# Patient Record
Sex: MALE | Race: WHITE | Employment: OTHER | URBAN - METROPOLITAN AREA
[De-identification: names, ages, dates, MRNs, and addresses within clinical notes are randomized per-mention and may not be internally consistent; named-entity substitution may affect disease eponyms.]

---

## 2017-01-31 ENCOUNTER — TRANSFERRED RECORDS (OUTPATIENT)
Dept: HEALTH INFORMATION MANAGEMENT | Facility: CLINIC | Age: 82
End: 2017-01-31

## 2017-04-18 ENCOUNTER — TELEPHONE (OUTPATIENT)
Dept: FAMILY MEDICINE | Facility: CLINIC | Age: 82
End: 2017-04-18

## 2017-04-18 NOTE — TELEPHONE ENCOUNTER
Reason for Call: Fax recent labs    Detailed comments: caller would like most recent labs faxed to them.    Phone Number Patient can be reached at: Other phone number:  [phone]    Best Time: 7:30 am to 3:30 pm    Can we leave a detailed message on this number? YES    Call taken on 4/18/2017 at 1:02 PM by Emerita Forte

## 2017-11-02 ENCOUNTER — OFFICE VISIT (OUTPATIENT)
Dept: OPHTHALMOLOGY | Facility: CLINIC | Age: 82
End: 2017-11-02
Payer: COMMERCIAL

## 2017-11-02 DIAGNOSIS — Z96.1 PSEUDOPHAKIA: ICD-10-CM

## 2017-11-02 DIAGNOSIS — H43.813 POSTERIOR VITREOUS DETACHMENT, BILATERAL: ICD-10-CM

## 2017-11-02 DIAGNOSIS — H35.372 EPIRETINAL MEMBRANE, LEFT: ICD-10-CM

## 2017-11-02 DIAGNOSIS — H52.4 PRESBYOPIA: ICD-10-CM

## 2017-11-02 DIAGNOSIS — Z01.01 ENCOUNTER FOR EXAMINATION OF EYES AND VISION WITH ABNORMAL FINDINGS: Primary | ICD-10-CM

## 2017-11-02 PROCEDURE — 92015 DETERMINE REFRACTIVE STATE: CPT | Performed by: OPHTHALMOLOGY

## 2017-11-02 PROCEDURE — 92014 COMPRE OPH EXAM EST PT 1/>: CPT | Performed by: OPHTHALMOLOGY

## 2017-11-02 ASSESSMENT — EXTERNAL EXAM - RIGHT EYE: OD_EXAM: 2+ BROW PTOSIS

## 2017-11-02 ASSESSMENT — EXTERNAL EXAM - LEFT EYE: OS_EXAM: 2+ BROW PTOSIS

## 2017-11-02 ASSESSMENT — VISUAL ACUITY
OD_CC: 20/30
METHOD: SNELLEN - LINEAR
OS_CC: 20/30+3
OS_CC: J2+
OD_CC: J1

## 2017-11-02 ASSESSMENT — REFRACTION_MANIFEST
OD_SPHERE: -2.00
OS_CYLINDER: +0.50
OS_SPHERE: -0.50
OS_AXIS: 180
OD_ADD: +3.00
OD_CYLINDER: +1.25
OD_AXIS: 130
OS_ADD: +3.00

## 2017-11-02 ASSESSMENT — TONOMETRY
IOP_METHOD: APPLANATION
OS_IOP_MMHG: 11
OD_IOP_MMHG: 12

## 2017-11-02 ASSESSMENT — REFRACTION_WEARINGRX
SPECS_TYPE: BIFOCAL
OS_CYLINDER: SPHERE
OD_SPHERE: -1.87
OD_CYLINDER: +1.50
OS_SPHERE: -0.25
OD_ADD: +3.00
OD_AXIS: 030
OS_ADD: +3.00

## 2017-11-02 ASSESSMENT — SLIT LAMP EXAM - LIDS
COMMENTS: 2+ DERMATOCHALASIS - UPPER LID
COMMENTS: 2+ DERMATOCHALASIS - UPPER LID

## 2017-11-02 ASSESSMENT — CUP TO DISC RATIO
OD_RATIO: 0.4
OS_RATIO: 0.4

## 2017-11-02 NOTE — PROGRESS NOTES
Current Eye Medications:  no     Subjective:  Comprehensive eye exam.   Pt reports he continues see good reading and distance even without his glasses, states sees fine with his glasses when chooses to wear them.     Objective:  See Ophthalmology Exam.       Assessment:  Stable eye exam.      ICD-10-CM    1. Encounter for examination of eyes and vision with abnormal findings Z01.01 REFRACTIVE STATUS   2. Presbyopia H52.4 REFRACTIVE STATUS   3. Pseudophakia, ou; Yag Caps, od Z96.1 EYE EXAM (SIMPLE-NONBILLABLE)   4. Epiretinal membrane, mild, left H35.372    5. Posterior vitreous detachment, bilateral H43.813         Plan:  Possible clouding of posterior capsule discussed. Left eye   Glasses Rx given - optional   Call in July 2018 for an appointment in November 2018 for Complete Exam    Dr. Breaux [phone]

## 2017-11-02 NOTE — MR AVS SNAPSHOT
"              After Visit Summary   11/2/2017    Bishop Hinds    MRN: [id number]           Patient Information     Date Of Birth          5/3/1924        Visit Information        Provider Department      11/2/2017 9:00 AM Jeffrey Breaux MD [facility]        Today's Diagnoses     Encounter for examination of eyes and vision with abnormal findings    -  1    Presbyopia        Pseudophakia, ou; Yag Caps, od        Epiretinal membrane, mild, left        Posterior vitreous detachment, bilateral          Care Instructions    Possible clouding of posterior capsule discussed. Left eye   Glasses Rx given - optional   Call in July 2018 for an appointment in November 2018 for Complete Exam    Dr. Breaux [phone]          Follow-ups after your visit        Who to contact     If you have questions or need follow up information about today's clinic visit or your schedule please contact [facility] directly at [phone].  Normal or non-critical lab and imaging results will be communicated to you by MyChart, letter or phone within 4 business days after the clinic has received the results. If you do not hear from us within 7 days, please contact the clinic through [employer] or phone. If you have a critical or abnormal lab result, we will notify you by phone as soon as possible.  Submit refill requests through [employer] or call your pharmacy and they will forward the refill request to us. Please allow 3 business days for your refill to be completed.          Additional Information About Your Visit        MyChart Information     [facility] [facility] lets you send messages to your doctor, view your test results, renew your prescriptions, schedule appointments and more. To sign up, go to [email or web address] . Click on \"Log in\" on the left side of the screen, which will take you to the Welcome page. Then click on \"Sign up Now\" on the right side of the page.     You will be asked to enter the access code " listed below, as well as some personal information. Please follow the directions to create your username and password.     Your access code is: [email or web address]  Expires: 2018  9:54 AM     Your access code will  in 90 days. If you need help or a new code, please call your [place] clinic or [phone].        Care EveryWhere ID     This is your Care EveryWhere ID. This could be used by other organizations to access your [place] medical records  [id number]         Blood Pressure from Last 3 Encounters:   10/31/16 104/62   16 141/69   16 142/74    Weight from Last 3 Encounters:   10/31/16 80.3 kg (177 lb)   16 82.1 kg (181 lb)   16 82.3 kg (181 lb 6.4 oz)              We Performed the Following     EYE EXAM (SIMPLE-NONBILLABLE)     REFRACTIVE STATUS        Primary Care Provider Office Phone # Fax #    Juwan Dickson [phone] [address] [facility] [address]        Equal Access to Services     [facility]: Hadii aad ku hadasho Soomaali, waaxda luqadaha, qaybta kaalmada adeegyada, deandra guardado . So [facility] [phone].    ATENCIÓN: Si habla español, tiene a martell disposición servicios gratuitos de asistencia lingüística. Ll[facility] [phone].    We comply with applicable federal civil rights laws and [place] laws. We do not discriminate on the basis of race, color, national origin, age, disability, sex, sexual orientation, or gender identity.            Thank you!     Thank you for choosing [facility]  for your care. Our goal is always to provide you with excellent care. Hearing back from our patients is one way we can continue to improve our services. Please take a few minutes to complete the written survey that you may receive in the mail after your visit with us. Thank you!             Your Updated Medication List - Protect others around you: Learn how to safely use, store and throw away your medicines at  www.disposemymeds.org.          This list is accurate as of: 11/2/17  9:55 AM.  Always use your most recent med list.                   Brand Name Dispense Instructions for use Diagnosis    acetaminophen 325 MG tablet    TYLENOL    100 tablet    Take 2 tablets (650 mg) by mouth every 4 hours as needed    OA (osteoarthritis) of knee       allopurinol 300 MG tablet    ZYLOPRIM    90 tablet    Take 1 tablet (300 mg) by mouth daily    Gout       aspirin 81 MG tablet     100 tablet    Take 1 tablet (81 mg) by mouth daily    HTN (hypertension)       atenolol 25 MG tablet    TENORMIN    180 tablet    Take 1 tablet (25 mg) by mouth 2 times daily    HTN (hypertension)       finasteride 5 MG tablet    PROSCAR    90 tablet    Take 1 tablet (5 mg) by mouth daily    Hypertrophy of prostate with urinary obstruction and other lower urinary tract symptoms (LUTS)       HYDROCHLOROTHIAZIDE PO      Take 25 mg by mouth daily        LOSARTAN POTASSIUM PO      Take 50 mg by mouth daily        simvastatin 80 MG tablet    ZOCOR    90 tablet    1/2 TABLET EVERY EVENING    Hyperlipidemia LDL goal <130       tamsulosin 0.4 MG capsule    FLOMAX    90 capsule    Take 1 capsule (0.4 mg) by mouth At Bedtime    BPH (benign prostatic hyperplasia)       vitamin D 1000 UNITS capsule     90 capsule    Take 1 capsule by mouth daily    Hypovitaminosis D

## 2017-11-02 NOTE — PATIENT INSTRUCTIONS
Possible clouding of posterior capsule discussed. Left eye   Glasses Rx given - optional   Call in July 2018 for an appointment in November 2018 for Complete Exam    Dr. Breaux [phone]

## 2018-03-20 ENCOUNTER — TRANSFERRED RECORDS (OUTPATIENT)
Dept: HEALTH INFORMATION MANAGEMENT | Facility: CLINIC | Age: 83
End: 2018-03-20

## 2018-11-28 ENCOUNTER — TELEPHONE (OUTPATIENT)
Dept: FAMILY MEDICINE | Facility: CLINIC | Age: 83
End: 2018-11-28

## 2018-11-28 NOTE — TELEPHONE ENCOUNTER
Reason for Call:  Other call back    Detailed comments: Patient would like to know that his insurance coverage is good. Please call so he can relax (per patient).    Phone Number Patient can be reached at: Home number on file [phone] (home)    Best Time: Anytime    Can we leave a detailed message on this number? NO    Call taken on 11/28/2018 at 1:17 PM by Radha Crum

## 2019-03-13 ENCOUNTER — DOCUMENTATION ONLY (OUTPATIENT)
Dept: OPHTHALMOLOGY | Facility: CLINIC | Age: 84
End: 2019-03-13

## 2020-11-19 ENCOUNTER — TELEPHONE (OUTPATIENT)
Dept: FAMILY MEDICINE | Facility: CLINIC | Age: 85
End: 2020-11-19

## 2020-11-19 NOTE — TELEPHONE ENCOUNTER
"Per patient, he typically wakes up very early but oddly today, he woke up multiple times and fell asleep and did not wake up for the day until 2:00 PM   Each time he woke up, he was not able to focus his vision, \"eyes not focusing together\"  Changes in vision is new and sudden today and has not gotten any better  Patient does feel unsteady on his feet but stated it could be due to his vision   Denies any numbness/tingling, facial dropping, or weakness   He was not able to describe his vision other than, \"I am seeing 2 different things\" and \"eyes not focusing together\" and \"eyes seeing different things\"  Speech does not sound slurred  Breathing does not sound labored  Wife also elderly and has trouble explaining further  She does not think his face appears to be drooping  They are living alone     Advised that patient be seen in ED for further evaluation   They are not sure how to get to the hospital and unsure how to call for an ambulance  Advised that RN will call for them and explained that they will need to open the door when paramedics arrive  Explained that paramedics will first assess patient and determine if transfer to ED is indicated  Asked COVID19 screening questions and both are negative for COVID19 symptoms and exposure    Called 911 for ambulance to be dispatched to patient's home  They will send help out    Jose Jolly RN  "

## 2020-11-19 NOTE — TELEPHONE ENCOUNTER
Reason for call:  Symptom   Symptom or request: Eyes don't want to focus     Duration (how long have symptoms been present): This morning  Have you been treated for this before? No    Additional comments: na    Phone number to reach patient:  Other phone number:  [phone]    Best Time:  any    Can we leave a detailed message on this number?  YES    Travel screening: Not Applicable

## 2020-11-20 ENCOUNTER — TELEPHONE (OUTPATIENT)
Dept: FAMILY MEDICINE | Facility: CLINIC | Age: 85
End: 2020-11-20

## 2020-11-20 NOTE — TELEPHONE ENCOUNTER
Reason for call:  Order   Order or referral being requested: Neurology   Reason for request: per ER   Date needed: as soon as possible  Has the patient been seen by the PCP for this problem? Not Applicable    Additional comments: Patient's son calling stating that patient was in the ER and they're recommending that he sees a neurologist. They recommended Dr. Queen but she is booking out til January and they would like to see someone else sooner, please call to advise.    Phone number to reach patient:  Other phone number:  [phone]    Best Time:  Any     Can we leave a detailed message on this number?  YES    Travel screening: Not Applicable

## 2020-11-20 NOTE — TELEPHONE ENCOUNTER
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 Return if new symptoms.    We have not seen patient in over 4 years  Patient has not seen Dr. Dickson since 1/19/2016  Last saw Dr. Treviño on 10/31/2016    Dr. Kandi Queen is through [facility]  They have multiple locations son can try-    [place]:   [phone]  [place]:   [phone]  [place]:   [phone]  [place]:   [phone]  [place]:   [phone]    Called sonYang  He stated that neurology is checking other locations to see if they can get patient in sooner  He is waiting for their return call  He also has a phone number the ED gave to schedule ophth appointment  Per Yang, [facility]  Advised that he update the [facility] provider as well and see if they can get patient in with a [facility] neurologist asap  Advised that patient schedule an appointment with us if he would like [facility] to continue his cares here  Son verbalized understanding    Jose Jolly RN

## 2020-11-25 ENCOUNTER — TELEPHONE (OUTPATIENT)
Dept: OPHTHALMOLOGY | Facility: CLINIC | Age: 85
End: 2020-11-25

## 2020-11-25 NOTE — TELEPHONE ENCOUNTER
Spoke to patient and wife.  Received referral from Dr. Moreira for him to see Dr. Trivedi for diplopia.  Patient would like to check with his previous eye doctors to see if they feel he needs to be seen by Dr. Trivedi because he does not drive and would prefer not to come to the [place].  Gave them my direct number for them to call to schedule.     Lenore Madison on 11/25/2020 at 1:04 PM

## 2021-01-01 ENCOUNTER — LAB REQUISITION (OUTPATIENT)
Dept: LAB | Facility: CLINIC | Age: 86
End: 2021-01-01

## 2021-01-01 ENCOUNTER — TELEPHONE (OUTPATIENT)
Dept: FAMILY MEDICINE | Facility: CLINIC | Age: 86
End: 2021-01-01
Payer: COMMERCIAL

## 2021-01-01 ENCOUNTER — TRANSFERRED RECORDS (OUTPATIENT)
Dept: MULTI SPECIALTY CLINIC | Facility: CLINIC | Age: 86
End: 2021-01-01
Payer: COMMERCIAL

## 2021-01-01 ENCOUNTER — HEALTH MAINTENANCE LETTER (OUTPATIENT)
Age: 86
End: 2021-01-01

## 2021-01-01 ENCOUNTER — TELEPHONE (OUTPATIENT)
Dept: INTERNAL MEDICINE | Facility: CLINIC | Age: 86
End: 2021-01-01
Payer: COMMERCIAL

## 2021-01-01 DIAGNOSIS — M1A.0790 CHRONIC IDIOPATHIC GOUT INVOLVING TOE WITHOUT TOPHUS, UNSPECIFIED LATERALITY: ICD-10-CM

## 2021-01-01 DIAGNOSIS — Z01.812 ENCOUNTER FOR PREPROCEDURAL LABORATORY EXAMINATION: ICD-10-CM

## 2021-01-01 DIAGNOSIS — I10 ESSENTIAL (PRIMARY) HYPERTENSION: ICD-10-CM

## 2021-01-01 DIAGNOSIS — G47.00 INSOMNIA, UNSPECIFIED TYPE: ICD-10-CM

## 2021-01-01 DIAGNOSIS — N40.0 BPH (BENIGN PROSTATIC HYPERPLASIA): ICD-10-CM

## 2021-01-01 DIAGNOSIS — E55.9 VITAMIN D DEFICIENCY: ICD-10-CM

## 2021-01-01 DIAGNOSIS — I10 HYPERTENSION GOAL BP (BLOOD PRESSURE) < 140/90: ICD-10-CM

## 2021-01-01 DIAGNOSIS — N18.30 STAGE 3 CHRONIC KIDNEY DISEASE, UNSPECIFIED WHETHER STAGE 3A OR 3B CKD (H): Primary | ICD-10-CM

## 2021-01-01 LAB
ANION GAP SERPL CALCULATED.3IONS-SCNC: 13 MMOL/L (ref 5–18)
ANION GAP SERPL CALCULATED.3IONS-SCNC: 9 MMOL/L (ref 5–18)
BUN SERPL-MCNC: 40 MG/DL (ref 8–28)
BUN SERPL-MCNC: 40 MG/DL (ref 8–28)
CALCIUM SERPL-MCNC: 9.1 MG/DL (ref 8.5–10.5)
CALCIUM SERPL-MCNC: 9.6 MG/DL (ref 8.5–10.5)
CHLORIDE BLD-SCNC: 103 MMOL/L (ref 98–107)
CHLORIDE BLD-SCNC: 104 MMOL/L (ref 98–107)
CO2 SERPL-SCNC: 22 MMOL/L (ref 22–31)
CO2 SERPL-SCNC: 25 MMOL/L (ref 22–31)
CREAT SERPL-MCNC: 2.14 MG/DL (ref 0.7–1.3)
CREAT SERPL-MCNC: 2.33 MG/DL (ref 0.7–1.3)
ERYTHROCYTE [DISTWIDTH] IN BLOOD BY AUTOMATED COUNT: 13.8 % (ref 10–15)
GFR SERPL CREATININE-BSD FRML MDRD: 23 ML/MIN/1.73M2
GFR SERPL CREATININE-BSD FRML MDRD: 25 ML/MIN/1.73M2
GLUCOSE BLD-MCNC: 78 MG/DL (ref 70–125)
GLUCOSE BLD-MCNC: 98 MG/DL (ref 70–125)
HCT VFR BLD AUTO: 38 % (ref 40–53)
HGB BLD-MCNC: 12.3 G/DL (ref 13.3–17.7)
MCH RBC QN AUTO: 31.5 PG (ref 26.5–33)
MCHC RBC AUTO-ENTMCNC: 32.4 G/DL (ref 31.5–36.5)
MCV RBC AUTO: 97 FL (ref 78–100)
PLATELET # BLD AUTO: 321 10E3/UL (ref 150–450)
POTASSIUM BLD-SCNC: 4.5 MMOL/L (ref 3.5–5)
POTASSIUM BLD-SCNC: 4.8 MMOL/L (ref 3.5–5)
RBC # BLD AUTO: 3.9 10E6/UL (ref 4.4–5.9)
RETINOPATHY: NORMAL
SODIUM SERPL-SCNC: 138 MMOL/L (ref 136–145)
SODIUM SERPL-SCNC: 138 MMOL/L (ref 136–145)
WBC # BLD AUTO: 8.3 10E3/UL (ref 4–11)

## 2021-01-01 PROCEDURE — 80048 BASIC METABOLIC PNL TOTAL CA: CPT

## 2021-01-01 PROCEDURE — 80048 BASIC METABOLIC PNL TOTAL CA: CPT | Performed by: NURSE PRACTITIONER

## 2021-01-01 PROCEDURE — 85027 COMPLETE CBC AUTOMATED: CPT

## 2021-01-01 RX ORDER — FINASTERIDE 5 MG/1
TABLET, FILM COATED ORAL
Qty: 28 TABLET | Refills: 1 | Status: SHIPPED | OUTPATIENT
Start: 2021-01-01 | End: 2022-01-01

## 2021-01-01 RX ORDER — CHOLECALCIFEROL (VITAMIN D3) 25 MCG
TABLET ORAL
Qty: 28 TABLET | Refills: 1 | Status: SHIPPED | OUTPATIENT
Start: 2021-01-01 | End: 2022-01-01

## 2021-01-01 RX ORDER — MULTIVIT,CALC,MINS/IRON/FOLIC 9MG-400MCG
TABLET ORAL
Qty: 28 TABLET | Refills: 1 | Status: SHIPPED | OUTPATIENT
Start: 2021-01-01 | End: 2022-01-01

## 2021-01-01 RX ORDER — AMLODIPINE BESYLATE 5 MG/1
TABLET ORAL
Qty: 28 TABLET | Refills: 1 | Status: SHIPPED | OUTPATIENT
Start: 2021-01-01 | End: 2022-01-01

## 2021-01-01 RX ORDER — ALLOPURINOL 100 MG/1
TABLET ORAL
Qty: 28 TABLET | Refills: 1 | Status: SHIPPED | OUTPATIENT
Start: 2021-01-01 | End: 2022-01-01

## 2021-01-01 RX ORDER — QUETIAPINE FUMARATE 25 MG/1
TABLET, FILM COATED ORAL
Qty: 14 TABLET | Refills: 1 | Status: SHIPPED | OUTPATIENT
Start: 2021-01-01 | End: 2022-01-01

## 2021-01-01 RX ORDER — TAMSULOSIN HYDROCHLORIDE 0.4 MG/1
CAPSULE ORAL
Qty: 28 CAPSULE | Refills: 1 | Status: SHIPPED | OUTPATIENT
Start: 2021-01-01 | End: 2022-01-01

## 2021-01-19 ENCOUNTER — OFFICE VISIT (OUTPATIENT)
Dept: DERMATOLOGY | Facility: CLINIC | Age: 86
End: 2021-01-19
Payer: COMMERCIAL

## 2021-01-19 VITALS — SYSTOLIC BLOOD PRESSURE: 111 MMHG | DIASTOLIC BLOOD PRESSURE: 68 MMHG | HEART RATE: 65 BPM | OXYGEN SATURATION: 93 %

## 2021-01-19 DIAGNOSIS — L81.4 LENTIGO: ICD-10-CM

## 2021-01-19 DIAGNOSIS — D18.01 ANGIOMA OF SKIN: ICD-10-CM

## 2021-01-19 DIAGNOSIS — L82.1 SEBORRHEIC KERATOSIS: ICD-10-CM

## 2021-01-19 DIAGNOSIS — C44.712 BASAL CELL CARCINOMA (BCC) OF RIGHT LOWER LEG: Primary | ICD-10-CM

## 2021-01-19 PROCEDURE — 17313 MOHS 1 STAGE T/A/L: CPT | Performed by: DERMATOLOGY

## 2021-01-19 PROCEDURE — 99203 OFFICE O/P NEW LOW 30 MIN: CPT | Mod: 25 | Performed by: DERMATOLOGY

## 2021-01-19 PROCEDURE — 11102 TANGNTL BX SKIN SINGLE LES: CPT | Mod: 59 | Performed by: DERMATOLOGY

## 2021-01-19 PROCEDURE — 88331 PATH CONSLTJ SURG 1 BLK 1SPC: CPT | Mod: 59 | Performed by: DERMATOLOGY

## 2021-01-19 PROCEDURE — 12032 INTMD RPR S/A/T/EXT 2.6-7.5: CPT | Performed by: DERMATOLOGY

## 2021-01-19 PROCEDURE — 17314 MOHS ADDL STAGE T/A/L: CPT | Performed by: DERMATOLOGY

## 2021-01-19 NOTE — PATIENT INSTRUCTIONS
Open Wound Care           ? No strenuous activity for 48 hours    ? Take Tylenol as needed for discomfort.                                                .         ? Do not drink alcoholic beverages for 48 hours.    ? Keep the pressure bandage in place for 24 hours. If the bandage becomes blood tinged or loose, reinforce it with gauze and tape.        (Refer to the reverse side of this page for management of bleeding).    ? Remove bandage in 24 hours and begin wound care as follows:     1. Clean area with tap water using a Q tip or gauze pad, (shower / bathe normally)  2. Dry wound with Q tip or gauze pad  3. Apply Aquaphor, Vaseline, Polysporin or Bacitracin Ointment with a Q tip    Do NOT use Neosporin Ointment *  4. Cover the wound with a band-aid or nonstick gauze pad and paper tape.  5. Repeat wound care once a day until wound is completely healed.    It is an old wives tale that a wound heals better when it is exposed to air and allowed to dry out. The wound will heal faster with a better cosmetic result if it is kept moist with ointment and covered with a bandage.  Do not let the wound dry out.      Supplies Needed:                Qtips or gauze pads                Polysporin or Bacitracin Ointment                Bandaids or nonstick gauze pads and paper tape    Wound care kits and brown paper tape are available for purchase at   the pharmacy.    BLEEDIN. Use tightly rolled up gauze or cloth to apply direct pressure over the bandage for 20   minutes.  2. Reapply pressure for an additional 20 minutes if necessary  3. Call the office or go to the nearest emergency room if pressure fails to stop the bleeding.  4. Use additional gauze and tape to maintain pressure once the bleeding has stopped.  5. Begin wound care 24 hours after surgery as directed.                  WOUND HEALING    1. One week after surgery a pink / red halo will form around the outside of the wound.   This is new skin.  2. The center of  the wound will appear yellowish white and produce some drainage.  3. The pink halo will slowly migrate in toward the center of the wound until the wound is covered with new shiny pink skin.  4. There will be no more drainage when the wound is completely healed.  5. It will take six months to one year for the redness to fade.  6. The scar may be itchy, tight and sensitive to extreme temperatures for a year after the surgery.  7. Massaging the area several times a day for several minutes after the wound is completely healed will help the scar soften and normalize faster. Begin massage only after healing is complete.      In case of emergency call: Dr Herman: [phone]     [facility]: [phone]    [facility]: [phone]

## 2021-01-19 NOTE — LETTER
1/19/2021         RE: Bishop Hinds  [address]  [facility] [address]        Dear Colleague,    Thank you for referring your patient, Bishop Hinds, to the [facility]. Please see a copy of my visit note below.    Bishop Hinds is an extremely pleasant 89 year old year old male patient here today for non healing lesion on right lower leg.   .   Patient states this has been present for years.  Patient reports the following symptoms:  bleeding.  Patient reports the following previous treatments none.  These treatments did not work.  Patient reports the following modifying factors none.  Associated symptoms: none.  Patient has no other skin complaints today.  Remainder of the HPI, Meds, PMH, Allergies, FH, and SH was reviewed in chart.      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        Past Surgical History:   Procedure Laterality Date     ARTHROPLASTY MINIMALLY INVASIVE KNEE  2/5/2014    Procedure: ARTHROPLASTY MINIMALLY INVASIVE KNEE;  Right Total Knee Arthroplasty Minimally Invasive ;  Surgeon: Alvarez Giang MD;  Location: [facility]     CATARACT IOL, RT/LT       COLONOSCOPY  12/94    14 polypls     CYSTOSCOPY  2/2010     CYSTOSCOPY  2/2010    had  2 procedures for removal of bladder stones     EXCISE LESION CHEEK N/A 10/8/2014    Procedure: EXCISE LESION CHEEK;  Surgeon: Sarah Macdonald MD;  Location: [facility] OR     LASER YAG CAPSULOTOMY   3/2015    right eye     PHACOEMULSIFICATION WITH STANDARD INTRAOCULAR LENS IMPLANT  10/2014; 5/2015    right eye; left eye     TONSILLECTOMY & ADENOIDECTOMY  age of 6        Family History   Problem Relation Age of Onset     Cerebrovascular Disease Mother      Cancer Sister      Diabetes Son        Social History     Socioeconomic History     Marital status:      Spouse name: Not on file     Number of children: Not on file     Years of education: Not on file     Highest education level: Not on file   Occupational History     Not on file   Social Needs     Financial resource strain: Not on file     Food insecurity     Worry: Not on file     Inability: Not on file     Transportation needs     Medical: Not on file     Non-medical: Not on file   Tobacco Use     Smoking status: Former Smoker     Years: 60.00     Types: Cigarettes     Smokeless tobacco: Never Used   Substance and Sexual Activity     Alcohol use: Yes     Comment: Occasional     Drug use: No     Sexual activity: Not Currently   Lifestyle     Physical activity     Days per week: Not on file     Minutes per session: Not on file     Stress: Not on file   Relationships     Social connections     Talks on phone: Not on file     Gets together: Not on file     Attends Mosque service: Not on file     Active member of club or organization: Not on file     Attends meetings of clubs or organizations: Not on file     Relationship status: Not on file     Intimate partner violence     Fear of current or ex partner: Not on file     Emotionally abused: Not on file     Physically abused: Not on file     Forced sexual activity: Not on file   Other Topics Concern     Parent/sibling w/ CABG, MI or angioplasty before 65F 55M? No   Social History Narrative     Not on file       Outpatient Encounter Medications as of 1/19/2021   Medication Sig Dispense Refill     acetaminophen (TYLENOL) 325 MG tablet Take 2 tablets (650 mg) by mouth every 4 hours as needed 100 tablet       allopurinol (ZYLOPRIM) 300 MG tablet Take 1 tablet (300 mg) by mouth daily 90 tablet 1     aspirin 81 MG tablet Take 1 tablet (81 mg) by mouth daily 100 tablet 1     LOSARTAN POTASSIUM PO Take 50 mg by mouth daily       simvastatin (ZOCOR) 80 MG tablet 1/2 TABLET EVERY EVENING 90 tablet 1     atenolol (TENORMIN) 25 MG tablet Take 1 tablet (25 mg) by mouth 2 times daily (Patient not taking: Reported on 1/19/2021) 180 tablet 1     Cholecalciferol (VITAMIN D) 1000 UNITS capsule Take 1 capsule by mouth daily 90 capsule 1     finasteride (PROSCAR) 5 MG tablet Take 1 tablet (5 mg) by mouth daily (Patient not taking: Reported on 1/19/2021) 90 tablet 1     HYDROCHLOROTHIAZIDE PO Take 25 mg by mouth daily       tamsulosin (FLOMAX) 0.4 MG 24 hr capsule Take 1 capsule (0.4 mg) by mouth At Bedtime (Patient not taking: Reported on 1/19/2021) 90 capsule 1     No facility-administered encounter medications on file as of 1/19/2021.              Review Of Systems  Skin: As above  Eyes: negative  Ears/Nose/Throat: negative  Respiratory: No shortness of breath, dyspnea on exertion, cough, or hemoptysis  Cardiovascular: negative  Gastrointestinal: negative  Genitourinary: negative  Musculoskeletal: negative  Neurologic: negative  Psychiatric: negative  Hematologic/Lymphatic/Immunologic: negative  Endocrine: negative      O:   NAD, WDWN, Alert & Oriented, Mood & Affect wnl, Vitals stable   Here today with friend    /68   Pulse 65   SpO2 93%    General appearance normal   Vitals stable   Alert, oriented and in no acute distress      Following lymph nodes palpated: popliteal no lad   R medial lower leg 3.5cm uclerated red plaque      Stuck on papules and brown macules on trunk and ext   Red papules on trunk     The remainder of expanded problem focused exam was normal; the following areas were examined:  conjunctiva/lids, face, neck, , chest, digits/nails, RUE, LUE, RLE, LLE.      Eyes: Conjunctivae/lids:Normal     ENT: Lips,  buccal mucosa, tongue: normal    MSK:Normal    Cardiovascular: peripheral edema none    Pulm: Breathing Normal    Lymph Nodes: No Head and Neck Lymphadenopathy     Neuro/Psych: Orientation:Alert and Orientedx3 ; Mood/Affect:normal       MICRO:   R medial lower leg :Orthokeratosis of epidermis with a proliferation of nests of basaloid cells, with peripheral palisading and a haphazard arrangement in the center extending into the dermis, forming nodules.  The tumor cells have hyperchromatic nuclei. Poor cytoplasm and intercellular bridging.    A/P:  1. Seborrheic keratosis, lentigo, angioma,   2. R medial lower leg r/o basal cell carcinoma   TANGENTIAL BIOPSY IN HOUSE:  After consent, anesthesia with LEC and prep, tangential excision performed and dx above confirmed with frozen section histology.  No complications and routine wound care.  Patient is not on  anticoagulants and risk of bleeding discussed with patient.       I have personally reviewed all specimens and/or slides and used them with my medical judgement to determine or confirm the final diagnosis.     Patient told result basal cell carcinoma .      It was a pleasure speaking to Bishop Hinds today.  BENIGN LESIONS DISCUSSED WITH PATIENT:  I discussed the specifics of tumor, prognosis, and genetics of benign lesions.  I explained that treatment of these lesions would be purely cosmetic and not medically neccessary.  I discussed with patient different removal options including excision, cautery and /or laser.      Nature and genetics of benign skin lesions dicussed with patient.  Signs and Symptoms of skin cancer discussed with patient.  Patient encouraged to perform monthly skin exams.  UV precautions reviewed with patient.  Skin care regimen reviewed with patient: Eliminate harsh soaps, i.e. Dial, zest, irsih spring; Mild soaps such as Cetaphil or Dove sensitive skin, avoid hot or cold showers, aggressive use of emollients including vanicream, cetaphil  or cerave discussed with patient.    Return to clinic 1 months  PROCEDURE NOTE  R medial lower leg basal cell carcinoma   MOHS:   Size    The rationale for Mohs surgery was discussed with the patient and consent was obtained.  The risks and benefits as well as alternatives to therapy were discussed, in detail.  Specifically, the risks of infection, scarring, bleeding, prolonged wound healing, incomplete removal, allergy to anesthesia, nerve injury and recurrence were addressed.  Indication for Mohs was Size. Prior to the procedure, the treatment site was clearly identified and, if available, confirmed with previous photos and confirmed by the patient   All components of the Universal Protocol/PAUSE rule were completed.  The Mohs surgeon operated in two distinct and integrated capacities as the surgeon and pathologist.      The area was prepped with Betasept.  A rim of normal appearing skin was marked circumferentially around the lesion.  The area was infiltrated with local anesthesia.  The tumor was first debulked to remove all clinically apparent tumor.  An incision following the standard Mohs approach was done and the specimen was oriented,mapped and placed in 4 block(s).  Each specimen was then chromacoded and processed in the Mohs laboratory using standard Mohs technique and submitted for frozen section histology.  Frozen section analysis showed  residual tumor but CLEAR MARGINS.      The tumor was excised using standard Mohs technique in 3 stages(s).  CLEAR MARGINS OBTAINED and Final defect size was 4.5 x 5 cm.     We discussed the options for wound management in full with the patient including risks/benefits/ possible outcomes.        REPAIR INT: Because of the depth of the wound a intermediate closure was planned. After LEC anesthesia and prep, Burow's triangles were excised in the relaxed skin tension lines. Hemostasis was obtained. The wound edges were closed in a layered fashion using Vicryl and Fast  Absorbing Plain Gut sutures. Postoperative length was 6.5 cm.   EBL minimal; complications none; wound care routine.  The patient was discharged in good condition and will return in one week for wound evaluation.        Again, thank you for allowing me to participate in the care of your patient.        Sincerely,        Tim Herman MD

## 2021-01-19 NOTE — NURSING NOTE
Surgical Office Location :   [facility]  [address] [place] [address]

## 2021-01-19 NOTE — PROGRESS NOTES
Bishop Hinds is an extremely pleasant 89 year old year old male patient here today for non healing lesion on right lower leg.   .   Patient states this has been present for years.  Patient reports the following symptoms:  bleeding.  Patient reports the following previous treatments none.  These treatments did not work.  Patient reports the following modifying factors none.  Associated symptoms: none.  Patient has no other skin complaints today.  Remainder of the HPI, Meds, PMH, Allergies, FH, and SH was reviewed in chart.      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        Past Surgical History:   Procedure Laterality Date     ARTHROPLASTY MINIMALLY INVASIVE KNEE  2/5/2014    Procedure: ARTHROPLASTY MINIMALLY INVASIVE KNEE;  Right Total Knee Arthroplasty Minimally Invasive ;  Surgeon: Alvarez Giang MD;  [facility]     CATARACT IOL, RT/LT       COLONOSCOPY  12/94    14 polypls     CYSTOSCOPY  2/2010     CYSTOSCOPY  2/2010    had  2 procedures for removal of bladder stones     EXCISE LESION CHEEK N/A 10/8/2014    Procedure: EXCISE LESION CHEEK;  Surgeon: Sarah Macdonald MD;  Location: [facility] OR     LASER YAG CAPSULOTOMY  3/2015    right eye     PHACOEMULSIFICATION WITH STANDARD INTRAOCULAR LENS IMPLANT  10/2014; 5/2015    right eye; left eye     TONSILLECTOMY & ADENOIDECTOMY  age of 6        Family History   Problem Relation Age of Onset     Cerebrovascular Disease Mother       Cancer Sister      Diabetes Son        Social History     Socioeconomic History     Marital status:      Spouse name: Not on file     Number of children: Not on file     Years of education: Not on file     Highest education level: Not on file   Occupational History     Not on file   Social Needs     Financial resource strain: Not on file     Food insecurity     Worry: Not on file     Inability: Not on file     Transportation needs     Medical: Not on file     Non-medical: Not on file   Tobacco Use     Smoking status: Former Smoker     Years: 60.00     Types: Cigarettes     Smokeless tobacco: Never Used   Substance and Sexual Activity     Alcohol use: Yes     Comment: Occasional     Drug use: No     Sexual activity: Not Currently   Lifestyle     Physical activity     Days per week: Not on file     Minutes per session: Not on file     Stress: Not on file   Relationships     Social connections     Talks on phone: Not on file     Gets together: Not on file     Attends Sikh service: Not on file     Active member of club or organization: Not on file     Attends meetings of clubs or organizations: Not on file     Relationship status: Not on file     Intimate partner violence     Fear of current or ex partner: Not on file     Emotionally abused: Not on file     Physically abused: Not on file     Forced sexual activity: Not on file   Other Topics Concern     Parent/sibling w/ CABG, MI or angioplasty before 65F 55M? No   Social History Narrative     Not on file       Outpatient Encounter Medications as of 1/19/2021   Medication Sig Dispense Refill     acetaminophen (TYLENOL) 325 MG tablet Take 2 tablets (650 mg) by mouth every 4 hours as needed 100 tablet      allopurinol (ZYLOPRIM) 300 MG tablet Take 1 tablet (300 mg) by mouth daily 90 tablet 1     aspirin 81 MG tablet Take 1 tablet (81 mg) by mouth daily 100 tablet 1     LOSARTAN POTASSIUM PO Take 50 mg by mouth daily       simvastatin (ZOCOR) 80 MG tablet 1/2  TABLET EVERY EVENING 90 tablet 1     atenolol (TENORMIN) 25 MG tablet Take 1 tablet (25 mg) by mouth 2 times daily (Patient not taking: Reported on 1/19/2021) 180 tablet 1     Cholecalciferol (VITAMIN D) 1000 UNITS capsule Take 1 capsule by mouth daily 90 capsule 1     finasteride (PROSCAR) 5 MG tablet Take 1 tablet (5 mg) by mouth daily (Patient not taking: Reported on 1/19/2021) 90 tablet 1     HYDROCHLOROTHIAZIDE PO Take 25 mg by mouth daily       tamsulosin (FLOMAX) 0.4 MG 24 hr capsule Take 1 capsule (0.4 mg) by mouth At Bedtime (Patient not taking: Reported on 1/19/2021) 90 capsule 1     No facility-administered encounter medications on file as of 1/19/2021.              Review Of Systems  Skin: As above  Eyes: negative  Ears/Nose/Throat: negative  Respiratory: No shortness of breath, dyspnea on exertion, cough, or hemoptysis  Cardiovascular: negative  Gastrointestinal: negative  Genitourinary: negative  Musculoskeletal: negative  Neurologic: negative  Psychiatric: negative  Hematologic/Lymphatic/Immunologic: negative  Endocrine: negative      O:   NAD, WDWN, Alert & Oriented, Mood & Affect wnl, Vitals stable   Here today with friend    /68   Pulse 65   SpO2 93%    General appearance normal   Vitals stable   Alert, oriented and in no acute distress      Following lymph nodes palpated: popliteal no lad   R medial lower leg 3.5cm uclerated red plaque      Stuck on papules and brown macules on trunk and ext   Red papules on trunk     The remainder of expanded problem focused exam was normal; the following areas were examined:  conjunctiva/lids, face, neck, , chest, digits/nails, RUE, LUE, RLE, LLE.      Eyes: Conjunctivae/lids:Normal     ENT: Lips, buccal mucosa, tongue: normal    MSK:Normal    Cardiovascular: peripheral edema none    Pulm: Breathing Normal    Lymph Nodes: No Head and Neck Lymphadenopathy     Neuro/Psych: Orientation:Alert and Orientedx3 ; Mood/Affect:normal       MICRO:   R medial lower  leg :Orthokeratosis of epidermis with a proliferation of nests of basaloid cells, with peripheral palisading and a haphazard arrangement in the center extending into the dermis, forming nodules.  The tumor cells have hyperchromatic nuclei. Poor cytoplasm and intercellular bridging.    A/P:  1. Seborrheic keratosis, lentigo, angioma,   2. R medial lower leg r/o basal cell carcinoma   TANGENTIAL BIOPSY IN HOUSE:  After consent, anesthesia with LEC and prep, tangential excision performed and dx above confirmed with frozen section histology.  No complications and routine wound care.  Patient is not on  anticoagulants and risk of bleeding discussed with patient.       I have personally reviewed all specimens and/or slides and used them with my medical judgement to determine or confirm the final diagnosis.     Patient told result basal cell carcinoma .      It was a pleasure speaking to Bishop Hinds today.  BENIGN LESIONS DISCUSSED WITH PATIENT:  I discussed the specifics of tumor, prognosis, and genetics of benign lesions.  I explained that treatment of these lesions would be purely cosmetic and not medically neccessary.  I discussed with patient different removal options including excision, cautery and /or laser.      Nature and genetics of benign skin lesions dicussed with patient.  Signs and Symptoms of skin cancer discussed with patient.  Patient encouraged to perform monthly skin exams.  UV precautions reviewed with patient.  Skin care regimen reviewed with patient: Eliminate harsh soaps, i.e. Dial, zest, irsih spring; Mild soaps such as Cetaphil or Dove sensitive skin, avoid hot or cold showers, aggressive use of emollients including vanicream, cetaphil or cerave discussed with patient.    Return to clinic 1 months  PROCEDURE NOTE  R medial lower leg basal cell carcinoma   MOHS:   Size    The rationale for Mohs surgery was discussed with the patient and consent was obtained.  The risks and benefits as well as  alternatives to therapy were discussed, in detail.  Specifically, the risks of infection, scarring, bleeding, prolonged wound healing, incomplete removal, allergy to anesthesia, nerve injury and recurrence were addressed.  Indication for Mohs was Size. Prior to the procedure, the treatment site was clearly identified and, if available, confirmed with previous photos and confirmed by the patient   All components of the Universal Protocol/PAUSE rule were completed.  The Mohs surgeon operated in two distinct and integrated capacities as the surgeon and pathologist.      The area was prepped with Betasept.  A rim of normal appearing skin was marked circumferentially around the lesion.  The area was infiltrated with local anesthesia.  The tumor was first debulked to remove all clinically apparent tumor.  An incision following the standard Mohs approach was done and the specimen was oriented,mapped and placed in 4 block(s).  Each specimen was then chromacoded and processed in the Mohs laboratory using standard Mohs technique and submitted for frozen section histology.  Frozen section analysis showed  residual tumor but CLEAR MARGINS.      The tumor was excised using standard Mohs technique in 3 stages(s).  CLEAR MARGINS OBTAINED and Final defect size was 4.5 x 5 cm.     We discussed the options for wound management in full with the patient including risks/benefits/ possible outcomes.        REPAIR INT: Because of the depth of the wound a intermediate closure was planned. After LEC anesthesia and prep, Burow's triangles were excised in the relaxed skin tension lines. Hemostasis was obtained. The wound edges were closed in a layered fashion using Vicryl and Fast Absorbing Plain Gut sutures. Postoperative length was 6.5 cm.   EBL minimal; complications none; wound care routine.  The patient was discharged in good condition and will return in one week for wound evaluation.

## 2021-01-28 ENCOUNTER — OFFICE VISIT (OUTPATIENT)
Dept: INTERNAL MEDICINE | Facility: CLINIC | Age: 86
End: 2021-01-28
Payer: COMMERCIAL

## 2021-01-28 VITALS
SYSTOLIC BLOOD PRESSURE: 97 MMHG | OXYGEN SATURATION: 98 % | HEART RATE: 59 BPM | BODY MASS INDEX: 23.92 KG/M2 | WEIGHT: 155 LBS | TEMPERATURE: 97.9 F | RESPIRATION RATE: 16 BRPM | DIASTOLIC BLOOD PRESSURE: 60 MMHG

## 2021-01-28 DIAGNOSIS — I10 ESSENTIAL HYPERTENSION WITH GOAL BLOOD PRESSURE LESS THAN 140/90: Primary | ICD-10-CM

## 2021-01-28 DIAGNOSIS — Z85.828 HISTORY OF BASAL CELL CARCINOMA: ICD-10-CM

## 2021-01-28 DIAGNOSIS — K59.00 CONSTIPATION, UNSPECIFIED CONSTIPATION TYPE: ICD-10-CM

## 2021-01-28 DIAGNOSIS — H61.21 IMPACTED CERUMEN OF RIGHT EAR: ICD-10-CM

## 2021-01-28 DIAGNOSIS — M1A.0790 CHRONIC IDIOPATHIC GOUT INVOLVING TOE WITHOUT TOPHUS, UNSPECIFIED LATERALITY: ICD-10-CM

## 2021-01-28 PROCEDURE — 99204 OFFICE O/P NEW MOD 45 MIN: CPT | Mod: 25 | Performed by: INTERNAL MEDICINE

## 2021-01-28 PROCEDURE — 69209 REMOVE IMPACTED EAR WAX UNI: CPT | Mod: RT | Performed by: INTERNAL MEDICINE

## 2021-01-28 NOTE — PROGRESS NOTES
Assessment & Plan     Essential hypertension with goal blood pressure less than 140/90  Today was a follow up appointment with a patient I haven't seen for primary care for 4 years or more. This is because he gets all of his healthcare with the [facility]. He has at the very least an annual complete physical exam with laboratory studies and refills etc. I am being seen today just to take a look at a few different issues although nothing overly urgent . His blood pressure is tending to be on the over-treated side and he's had no actual lightheadedness or dizziness symptoms but is somewhat of a wall walker and maybe just a bit at risk for falling.. in trying my best to take a look at the big picture I am thinking he doesn't need 3 different anti-hypertensive medications with a blood pressure of 97 systolic blood pressure today . We recommended discontinuation of the diuretic therapy and further follow up with his primary health care provider . A weekly nurse visit to his house also is ongoing and she can recheck his blood pressure at that time with further follow up depending on how things go     History of basal cell carcinoma  Noted as a point of historical importance , removed form his left lower extremity      Impacted cerumen of right ear  He has a complete 100% occluded right ear. The left ear is within normal limits . Successfully irrigated out by my medical assistant    - REMOVE IMPACTED CERUMEN    Constipation, unspecified constipation type  They asked me questions about this and I recommended as much as possible increased physical activity, lots of fluid intake and finally they should try adding a nightly dose of MiraLax / GlycoLax (polyethylene glycol)     GOUT  Finally I was asked questions about this diagnosis and we reviewed the pathophysiology of uric acid metabolism and the clinical consequences of gout flare-ups which patient has none of at this point. Recommended to continue  current plan of care        25 minutes spent on the date of the encounter doing chart review, history and exam, documentation and further activities as noted above    Return in about 1 year (around 1/28/2022).    Juwan Dickson MD  [facility] YONI Alas is a 89 year old who presents to clinic today for the following health issues  accompanied by his son:    HPI     Moh's micrographic surgery  One week ago    Other issues , see as detailed above with the assessment and plan section      Review of Systems   Constitutional, HEENT, cardiovascular, pulmonary, gi and gu systems are negative, except as otherwise noted.      Objective    BP 97/60   Pulse 59   Temp 97.9  F (36.6  C) (Oral)   Resp 16   Wt 70.3 kg (155 lb)   SpO2 98%   BMI 23.92 kg/m    Body mass index is 23.92 kg/m .  Physical Exam   GENERAL: healthy, alert and no distress  EYES: Eyes grossly normal to inspection, PERRL and conjunctivae and sclerae normal  HENT: left ear canals and TM's normal right ear with complete occluded  Cerumen impaction , nose and mouth without ulcers or lesions  MS: no gross musculoskeletal defects noted, no edema  NEURO: Normal strength and tone, mentation intact and speech normal  PSYCH: mentation appears normal, affect normal/bright    Orders Placed This Encounter   Procedures     REMOVE IMPACTED CERUMEN         I spent a total of 25 minutes face-to-face with Bishop Hinds during today's office visit.  Over 50% of this time was spent counseling the patient and/or coordinating care regarding   Encounter Diagnoses   Name Primary?     Essential hypertension with goal blood pressure less than 140/90 Yes     History of basal cell carcinoma      Impacted cerumen of right ear      Constipation, unspecified constipation type      Chronic idiopathic gout involving toe without tophus, unspecified laterality     .  See note for details.

## 2021-01-28 NOTE — PATIENT INSTRUCTIONS
We did evaluation and management with Normans blood pressure and we feel he doesn't need the triamterene - hydrochlorothiazide and it was stopped today , recommended to continue with weekly or so, home blood pressure monitoring and please update our office with this requested information , what do these numbers look like over the next month     There is a potential for additional anti-hypertensive medication adjustments.

## 2021-02-04 ENCOUNTER — MYC MEDICAL ADVICE (OUTPATIENT)
Dept: INTERNAL MEDICINE | Facility: CLINIC | Age: 86
End: 2021-02-04

## 2021-02-12 ENCOUNTER — IMMUNIZATION (OUTPATIENT)
Dept: PEDIATRICS | Facility: CLINIC | Age: 86
End: 2021-02-12
Payer: COMMERCIAL

## 2021-02-12 PROCEDURE — 91300 PR COVID VAC PFIZER DIL RECON 30 MCG/0.3 ML IM: CPT

## 2021-02-12 PROCEDURE — 0001A PR COVID VAC PFIZER DIL RECON 30 MCG/0.3 ML IM: CPT

## 2021-02-17 ENCOUNTER — OFFICE VISIT (OUTPATIENT)
Dept: DERMATOLOGY | Facility: CLINIC | Age: 86
End: 2021-02-17
Payer: COMMERCIAL

## 2021-02-17 VITALS — DIASTOLIC BLOOD PRESSURE: 57 MMHG | HEART RATE: 52 BPM | SYSTOLIC BLOOD PRESSURE: 125 MMHG | OXYGEN SATURATION: 97 %

## 2021-02-17 DIAGNOSIS — Z85.828 HISTORY OF SKIN CANCER: Primary | ICD-10-CM

## 2021-02-17 PROCEDURE — 99212 OFFICE O/P EST SF 10 MIN: CPT | Performed by: DERMATOLOGY

## 2021-02-17 NOTE — PATIENT INSTRUCTIONS
Daily Wound Care Instructions    **Remove old bandage and wash area gently with water.    **Apply Aquaphor or Vaseline to wound.    **Cut telfa (non-stick bandage) to size. Dampen the telfa with saline or water and apply telfa to wound.    **Cover telfa with piece of gauze.    **Wrap Coban (Brown, stretchy, ace wrap) around wound/leg. Press to adhere to itself.    **Follow up in 1 month with Dr. KEYONNA Herman

## 2021-02-17 NOTE — LETTER
2/17/2021         RE: Bishop Hinds  [address]  [facility] [address]        Dear Colleague,    Thank you for referring your patient, Bishop Hinds, to the [facility]. Please see a copy of my visit note below.    Bishop Hinds is an extremely pleasant 89 year old year old male patient here today for wound check right leg.  Healing well no issues.   Patient has no other skin complaints today.  Remainder of the HPI, Meds, PMH, Allergies, FH, and SH was reviewed in chart.      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        Past Surgical History:   Procedure Laterality Date     ARTHROPLASTY MINIMALLY INVASIVE KNEE  2/5/2014    Procedure: ARTHROPLASTY MINIMALLY INVASIVE KNEE;  Right Total Knee Arthroplasty Minimally Invasive ;  Surgeon: Alvarez Giang MD;  Location: [facility]     CATARACT IOL, RT/LT       COLONOSCOPY  12/94    14 polypls     CYSTOSCOPY  2/2010     CYSTOSCOPY  2/2010    had  2 procedures for removal of bladder stones     EXCISE LESION CHEEK N/A 10/8/2014    Procedure: EXCISE LESION CHEEK;  Surgeon: Sarah Macdonald MD;  Location: [facility]     LASER YAG CAPSULOTOMY  3/2015    right eye     PHACOEMULSIFICATION WITH STANDARD INTRAOCULAR LENS IMPLANT  10/2014; 5/2015    right eye; left eye     TONSILLECTOMY & ADENOIDECTOMY  age of 6        Family History   Problem Relation Age of Onset      Cerebrovascular Disease Mother      Cancer Sister      Diabetes Son        Social History     Socioeconomic History     Marital status:      Spouse name: Not on file     Number of children: Not on file     Years of education: Not on file     Highest education level: Not on file   Occupational History     Not on file   Social Needs     Financial resource strain: Not on file     Food insecurity     Worry: Not on file     Inability: Not on file     Transportation needs     Medical: Not on file     Non-medical: Not on file   Tobacco Use     Smoking status: Former Smoker     Years: 60.00     Types: Cigarettes     Smokeless tobacco: Never Used   Substance and Sexual Activity     Alcohol use: Yes     Comment: Occasional     Drug use: No     Sexual activity: Not Currently   Lifestyle     Physical activity     Days per week: Not on file     Minutes per session: Not on file     Stress: Not on file   Relationships     Social connections     Talks on phone: Not on file     Gets together: Not on file     Attends Ep[employer] service: Not on file     Active member of club or organization: Not on file     Attends meetings of clubs or organizations: Not on file     Relationship status: Not on file     Intimate partner violence     Fear of current or ex partner: Not on file     Emotionally abused: Not on file     Physically abused: Not on file     Forced sexual activity: Not on file   Other Topics Concern     Parent/sibling w/ CABG, MI or angioplasty before 65F 55M? No   Social History Narrative     Not on file       Outpatient Encounter Medications as of 2/17/2021   Medication Sig Dispense Refill     acetaminophen (TYLENOL) 325 MG tablet Take 2 tablets (650 mg) by mouth every 4 hours as needed 100 tablet      allopurinol (ZYLOPRIM) 300 MG tablet Take 1 tablet (300 mg) by mouth daily 90 tablet 1     aspirin 81 MG tablet Take 1 tablet (81 mg) by mouth daily 100 tablet 1     atenolol (TENORMIN) 25 MG tablet Take 1 tablet (25  mg) by mouth 2 times daily 180 tablet 1     Cholecalciferol (VITAMIN D) 1000 UNITS capsule Take 1 capsule by mouth daily 90 capsule 1     finasteride (PROSCAR) 5 MG tablet Take 1 tablet (5 mg) by mouth daily 90 tablet 1     HYDROCHLOROTHIAZIDE PO Take 25 mg by mouth daily       LOSARTAN POTASSIUM PO Take 50 mg by mouth daily       simvastatin (ZOCOR) 80 MG tablet 1/2 TABLET EVERY EVENING 90 tablet 1     tamsulosin (FLOMAX) 0.4 MG 24 hr capsule Take 1 capsule (0.4 mg) by mouth At Bedtime 90 capsule 1     No facility-administered encounter medications on file as of 2/17/2021.              Review Of Systems  Skin: As above  Eyes: negative  Ears/Nose/Throat: negative  Respiratory: No shortness of breath, dyspnea on exertion, cough, or hemoptysis  Cardiovascular: negative  Gastrointestinal: negative  Genitourinary: negative  Musculoskeletal: negative  Neurologic: negative  Psychiatric: negative  Hematologic/Lymphatic/Immunologic: negative  Endocrine: negative      O:   NAD, WDWN, Alert & Oriented, Mood & Affect wnl, Vitals stable   Here today alone   /57 (BP Location: Left arm, Patient Position: Sitting, Cuff Size: Adult Regular)   Pulse 52   SpO2 97%    General appearance normal   Vitals stable   Alert, oriented and in no acute distress     Healthy grnaulating wound r leg      Eyes: Conjunctivae/lids:Normal     ENT: Lips, buccal mucosa, tongue: normal    MSK:Normal    Cardiovascular: peripheral edema none    Pulm: Breathing Normal    Neuro/Psych: Orientation:Alert and Orientedx3 ; Mood/Affect:normal       A/P:  1. Hx of non-melanoma skin cancer   Healing well  Wound care with telfa, coban, aquaphor and lamisil discussed with patient   Return to clinic 1 month  It was a pleasure speaking to Bishop Hinds today.        Again, thank you for allowing me to participate in the care of your patient.        Sincerely,        Tim Herman MD

## 2021-02-17 NOTE — NURSING NOTE
"Initial /57 (BP Location: Left arm, Patient Position: Sitting, Cuff Size: Adult Regular)   Pulse 52   SpO2 97%  Estimated body mass index is 23.92 kg/m  as calculated from the following:    Height as of 8/3/16: 1.715 m (5' 7.5\").    Weight as of 1/28/21: 70.3 kg (155 lb). .      "

## 2021-02-20 ENCOUNTER — HEALTH MAINTENANCE LETTER (OUTPATIENT)
Age: 86
End: 2021-02-20

## 2021-02-26 ENCOUNTER — TELEPHONE (OUTPATIENT)
Dept: DERMATOLOGY | Facility: CLINIC | Age: 86
End: 2021-02-26

## 2021-03-05 ENCOUNTER — IMMUNIZATION (OUTPATIENT)
Dept: PEDIATRICS | Facility: CLINIC | Age: 86
End: 2021-03-05
Attending: INTERNAL MEDICINE
Payer: COMMERCIAL

## 2021-03-05 PROCEDURE — 91300 PR COVID VAC PFIZER DIL RECON 30 MCG/0.3 ML IM: CPT

## 2021-03-05 PROCEDURE — 0002A PR COVID VAC PFIZER DIL RECON 30 MCG/0.3 ML IM: CPT

## 2021-03-17 ENCOUNTER — OFFICE VISIT (OUTPATIENT)
Dept: DERMATOLOGY | Facility: CLINIC | Age: 86
End: 2021-03-17
Payer: COMMERCIAL

## 2021-03-17 VITALS — HEART RATE: 50 BPM | SYSTOLIC BLOOD PRESSURE: 105 MMHG | DIASTOLIC BLOOD PRESSURE: 50 MMHG | RESPIRATION RATE: 16 BRPM

## 2021-03-17 DIAGNOSIS — Z85.828 HISTORY OF SKIN CANCER: Primary | ICD-10-CM

## 2021-03-17 PROCEDURE — 99212 OFFICE O/P EST SF 10 MIN: CPT | Performed by: DERMATOLOGY

## 2021-03-17 NOTE — NURSING NOTE
"Initial /50 (BP Location: Right arm, Patient Position: Sitting, Cuff Size: Adult Regular)   Pulse 50   Resp 16  Estimated body mass index is 23.92 kg/m  as calculated from the following:    Height as of 8/3/16: 1.715 m (5' 7.5\").    Weight as of 1/28/21: 70.3 kg (155 lb). .    [facility]    "

## 2021-03-17 NOTE — PROGRESS NOTES
Bishop Hinds is an extremely pleasant 89 year old year old male patient here today for wound check right leg, no issues healed!. Patient has no other skin complaints today.  Remainder of the HPI, Meds, PMH, Allergies, FH, and SH was reviewed in chart.      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        Past Surgical History:   Procedure Laterality Date     ARTHROPLASTY MINIMALLY INVASIVE KNEE  2/5/2014    Procedure: ARTHROPLASTY MINIMALLY INVASIVE KNEE;  Right Total Knee Arthroplasty Minimally Invasive ;  Surgeon: Alvarez Giang MD;  [facility]     CATARACT IOL, RT/LT       COLONOSCOPY  12/94    14 polypls     CYSTOSCOPY  2/2010     CYSTOSCOPY  2/2010    had  2 procedures for removal of bladder stones     EXCISE LESION CHEEK N/A 10/8/2014    Procedure: EXCISE LESION CHEEK;  Surgeon: Sarah Macdonald MD;  [facility]: [facility]     LASER YAG CAPSULOTOMY  3/2015    right eye     PHACOEMULSIFICATION WITH STANDARD INTRAOCULAR LENS IMPLANT  10/2014; 5/2015    right eye; left eye     TONSILLECTOMY & ADENOIDECTOMY  age of 6        Family History   Problem Relation Age of Onset     Cerebrovascular Disease Mother      Cancer Sister      Diabetes Son        Social History     Socioeconomic History     Marital status:      Spouse name: Not on file     Number of children: Not on file     Years of education: Not on file     Highest education  level: Not on file   Occupational History     Not on file   Social Needs     Financial resource strain: Not on file     Food insecurity     Worry: Not on file     Inability: Not on file     Transportation needs     Medical: Not on file     Non-medical: Not on file   Tobacco Use     Smoking status: Former Smoker     Years: 60.00     Types: Cigarettes     Smokeless tobacco: Never Used   Substance and Sexual Activity     Alcohol use: Yes     Comment: Occasional     Drug use: No     Sexual activity: Not Currently   Lifestyle     Physical activity     Days per week: Not on file     Minutes per session: Not on file     Stress: Not on file   Relationships     Social connections     Talks on phone: Not on file     Gets together: Not on file     Attends [employer] service: Not on file     Active member of club or organization: Not on file     Attends meetings of clubs or organizations: Not on file     Relationship status: Not on file     Intimate partner violence     Fear of current or ex partner: Not on file     Emotionally abused: Not on file     Physically abused: Not on file     Forced sexual activity: Not on file   Other Topics Concern     Parent/sibling w/ CABG, MI or angioplasty before 65F 55M? No   Social History Narrative     Not on file       Outpatient Encounter Medications as of 3/17/2021   Medication Sig Dispense Refill     acetaminophen (TYLENOL) 325 MG tablet Take 2 tablets (650 mg) by mouth every 4 hours as needed 100 tablet      allopurinol (ZYLOPRIM) 300 MG tablet Take 1 tablet (300 mg) by mouth daily 90 tablet 1     aspirin 81 MG tablet Take 1 tablet (81 mg) by mouth daily 100 tablet 1     atenolol (TENORMIN) 25 MG tablet Take 1 tablet (25 mg) by mouth 2 times daily 180 tablet 1     Cholecalciferol (VITAMIN D) 1000 UNITS capsule Take 1 capsule by mouth daily 90 capsule 1     finasteride (PROSCAR) 5 MG tablet Take 1 tablet (5 mg) by mouth daily 90 tablet 1     HYDROCHLOROTHIAZIDE PO Take 25 mg by mouth daily        LOSARTAN POTASSIUM PO Take 50 mg by mouth daily       simvastatin (ZOCOR) 80 MG tablet 1/2 TABLET EVERY EVENING 90 tablet 1     tamsulosin (FLOMAX) 0.4 MG 24 hr capsule Take 1 capsule (0.4 mg) by mouth At Bedtime 90 capsule 1     No facility-administered encounter medications on file as of 3/17/2021.              Review Of Systems  Skin: As above  Eyes: negative  Ears/Nose/Throat: negative  Respiratory: No shortness of breath, dyspnea on exertion, cough, or hemoptysis  Cardiovascular: negative  Gastrointestinal: negative  Genitourinary: negative  Musculoskeletal: negative  Neurologic: negative  Psychiatric: negative  Hematologic/Lymphatic/Immunologic: negative  Endocrine: negative      O:   NAD, WDWN, Alert & Oriented, Mood & Affect wnl, Vitals stable   Here today alone   /50 (BP Location: Right arm, Patient Position: Sitting, Cuff Size: Adult Regular)   Pulse 50   Resp 16    General appearance normal   Vitals stable   Alert, oriented and in no acute distress     R leg healed with 3mm open area      Eyes: Conjunctivae/lids:Normal     ENT: Lips, buccal mucosa, tongue: normal    MSK:Normal    Cardiovascular: peripheral edema none    Pulm: Breathing Normal    Neuro/Psych: Orientation:Alert and Orientedx3 ; Mood/Affect:normal       A/P:  1. Hx of non-melanoma skin cancer  Wound care discussed with patient cont emollient for one month  It was a pleasure speaking to Bishop Hinds today.  Signs and Symptoms of skin cancer discussed with patient.  Return to clinic 6 months

## 2021-03-17 NOTE — LETTER
3/17/2021         RE: Bishop Hinds  [address]  [facility] [address]        Dear Colleague,    Thank you for referring your patient, Bishop Hinds, to the [facility]. Please see a copy of my visit note below.    Bishop Hinds is an extremely pleasant 89 year old year old male patient here today for wound check right leg, no issues healed!. Patient has no other skin complaints today.  Remainder of the HPI, Meds, PMH, Allergies, FH, and SH was reviewed in chart.      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        Past Surgical History:   Procedure Laterality Date     ARTHROPLASTY MINIMALLY INVASIVE KNEE  2/5/2014    Procedure: ARTHROPLASTY MINIMALLY INVASIVE KNEE;  Right Total Knee Arthroplasty Minimally Invasive ;  Surgeon: Alvarez Giang MD;  [facility]     CATARACT IOL, RT/LT       COLONOSCOPY  12/94    14 polypls     CYSTOSCOPY  2/2010     CYSTOSCOPY  2/2010    had  2 procedures for removal of bladder stones     EXCISE LESION CHEEK N/A 10/8/2014    Procedure: EXCISE LESION CHEEK;  Surgeon: Sarah Macdonald MD;  Location: [facility]     LASER YAG CAPSULOTOMY  3/2015    right eye     PHACOEMULSIFICATION WITH STANDARD INTRAOCULAR LENS IMPLANT  10/2014; 5/2015    right eye; left eye     TONSILLECTOMY & ADENOIDECTOMY  age of 6        Family History   Problem Relation Age of Onset      Cerebrovascular Disease Mother      Cancer Sister      Diabetes Son        Social History     Socioeconomic History     Marital status:      Spouse name: Not on file     Number of children: Not on file     Years of education: Not on file     Highest education level: Not on file   Occupational History     Not on file   Social Needs     Financial resource strain: Not on file     Food insecurity     Worry: Not on file     Inability: Not on file     Transportation needs     Medical: Not on file     Non-medical: Not on file   Tobacco Use     Smoking status: Former Smoker     Years: 60.00     Types: Cigarettes     Smokeless tobacco: Never Used   Substance and Sexual Activity     Alcohol use: Yes     Comment: Occasional     Drug use: No     Sexual activity: Not Currently   Lifestyle     Physical activity     Days per week: Not on file     Minutes per session: Not on file     Stress: Not on file   Relationships     Social connections     Talks on phone: Not on file     Gets together: Not on file     Attends Mandaen service: Not on file     Active member of club or organization: Not on file     Attends meetings of clubs or organizations: Not on file     Relationship status: Not on file     Intimate partner violence     Fear of current or ex partner: Not on file     Emotionally abused: Not on file     Physically abused: Not on file     Forced sexual activity: Not on file   Other Topics Concern     Parent/sibling w/ CABG, MI or angioplasty before 65F 55M? No   Social History Narrative     Not on file       Outpatient Encounter Medications as of 3/17/2021   Medication Sig Dispense Refill     acetaminophen (TYLENOL) 325 MG tablet Take 2 tablets (650 mg) by mouth every 4 hours as needed 100 tablet      allopurinol (ZYLOPRIM) 300 MG tablet Take 1 tablet (300 mg) by mouth daily 90 tablet 1     aspirin 81 MG tablet Take 1 tablet (81 mg) by mouth daily 100 tablet 1     atenolol (TENORMIN) 25 MG tablet Take 1 tablet (25 mg)  by mouth 2 times daily 180 tablet 1     Cholecalciferol (VITAMIN D) 1000 UNITS capsule Take 1 capsule by mouth daily 90 capsule 1     finasteride (PROSCAR) 5 MG tablet Take 1 tablet (5 mg) by mouth daily 90 tablet 1     HYDROCHLOROTHIAZIDE PO Take 25 mg by mouth daily       LOSARTAN POTASSIUM PO Take 50 mg by mouth daily       simvastatin (ZOCOR) 80 MG tablet 1/2 TABLET EVERY EVENING 90 tablet 1     tamsulosin (FLOMAX) 0.4 MG 24 hr capsule Take 1 capsule (0.4 mg) by mouth At Bedtime 90 capsule 1     No facility-administered encounter medications on file as of 3/17/2021.              Review Of Systems  Skin: As above  Eyes: negative  Ears/Nose/Throat: negative  Respiratory: No shortness of breath, dyspnea on exertion, cough, or hemoptysis  Cardiovascular: negative  Gastrointestinal: negative  Genitourinary: negative  Musculoskeletal: negative  Neurologic: negative  Psychiatric: negative  Hematologic/Lymphatic/Immunologic: negative  Endocrine: negative      O:   NAD, WDWN, Alert & Oriented, Mood & Affect wnl, Vitals stable   Here today alone   /50 (BP Location: Right arm, Patient Position: Sitting, Cuff Size: Adult Regular)   Pulse 50   Resp 16    General appearance normal   Vitals stable   Alert, oriented and in no acute distress     R leg healed with 3mm open area      Eyes: Conjunctivae/lids:Normal     ENT: Lips, buccal mucosa, tongue: normal    MSK:Normal    Cardiovascular: peripheral edema none    Pulm: Breathing Normal    Neuro/Psych: Orientation:Alert and Orientedx3 ; Mood/Affect:normal       A/P:  1. Hx of non-melanoma skin cancer  Wound care discussed with patient cont emollient for one month  It was a pleasure speaking to Bishop Hinds today.  Signs and Symptoms of skin cancer discussed with patient.  Return to clinic 6 months        Again, thank you for allowing me to participate in the care of your patient.        Sincerely,        Tim Herman MD

## 2021-04-09 ENCOUNTER — OFFICE VISIT (OUTPATIENT)
Dept: INTERNAL MEDICINE | Facility: CLINIC | Age: 86
End: 2021-04-09
Payer: COMMERCIAL

## 2021-04-09 VITALS
OXYGEN SATURATION: 99 % | DIASTOLIC BLOOD PRESSURE: 61 MMHG | WEIGHT: 153 LBS | BODY MASS INDEX: 23.61 KG/M2 | HEART RATE: 61 BPM | RESPIRATION RATE: 16 BRPM | TEMPERATURE: 98.3 F | SYSTOLIC BLOOD PRESSURE: 119 MMHG

## 2021-04-09 DIAGNOSIS — N18.30 STAGE 3 CHRONIC KIDNEY DISEASE, UNSPECIFIED WHETHER STAGE 3A OR 3B CKD (H): ICD-10-CM

## 2021-04-09 DIAGNOSIS — R73.09 ELEVATED GLUCOSE: ICD-10-CM

## 2021-04-09 DIAGNOSIS — R68.89 SENSATION OF FEELING COLD: ICD-10-CM

## 2021-04-09 DIAGNOSIS — M1A.0790 CHRONIC IDIOPATHIC GOUT INVOLVING TOE WITHOUT TOPHUS, UNSPECIFIED LATERALITY: Primary | ICD-10-CM

## 2021-04-09 DIAGNOSIS — I10 HYPERTENSION GOAL BP (BLOOD PRESSURE) < 140/90: ICD-10-CM

## 2021-04-09 DIAGNOSIS — R00.1 SINUS BRADYCARDIA: ICD-10-CM

## 2021-04-09 LAB
ALBUMIN SERPL-MCNC: 3.5 G/DL (ref 3.4–5)
ALP SERPL-CCNC: 62 U/L (ref 40–150)
ALT SERPL W P-5'-P-CCNC: 17 U/L (ref 0–70)
ANION GAP SERPL CALCULATED.3IONS-SCNC: 3 MMOL/L (ref 3–14)
AST SERPL W P-5'-P-CCNC: 13 U/L (ref 0–45)
BASOPHILS # BLD AUTO: 0.1 10E9/L (ref 0–0.2)
BASOPHILS NFR BLD AUTO: 0.5 %
BILIRUB SERPL-MCNC: 0.6 MG/DL (ref 0.2–1.3)
BUN SERPL-MCNC: 40 MG/DL (ref 7–30)
CALCIUM SERPL-MCNC: 10 MG/DL (ref 8.5–10.1)
CHLORIDE SERPL-SCNC: 108 MMOL/L (ref 94–109)
CO2 SERPL-SCNC: 29 MMOL/L (ref 20–32)
CREAT SERPL-MCNC: 1.99 MG/DL (ref 0.66–1.25)
DIFFERENTIAL METHOD BLD: ABNORMAL
EOSINOPHIL # BLD AUTO: 0.4 10E9/L (ref 0–0.7)
EOSINOPHIL NFR BLD AUTO: 4.3 %
ERYTHROCYTE [DISTWIDTH] IN BLOOD BY AUTOMATED COUNT: 13.7 % (ref 10–15)
GFR SERPL CREATININE-BSD FRML MDRD: 27 ML/MIN/{1.73_M2}
GLUCOSE SERPL-MCNC: 96 MG/DL (ref 70–99)
HBA1C MFR BLD: 5.3 % (ref 0–5.6)
HCT VFR BLD AUTO: 44.1 % (ref 40–53)
HGB BLD-MCNC: 14.7 G/DL (ref 13.3–17.7)
LYMPHOCYTES # BLD AUTO: 1.6 10E9/L (ref 0.8–5.3)
LYMPHOCYTES NFR BLD AUTO: 17.6 %
MCH RBC QN AUTO: 33.2 PG (ref 26.5–33)
MCHC RBC AUTO-ENTMCNC: 33.3 G/DL (ref 31.5–36.5)
MCV RBC AUTO: 100 FL (ref 78–100)
MONOCYTES # BLD AUTO: 0.7 10E9/L (ref 0–1.3)
MONOCYTES NFR BLD AUTO: 7.8 %
NEUTROPHILS # BLD AUTO: 6.4 10E9/L (ref 1.6–8.3)
NEUTROPHILS NFR BLD AUTO: 69.8 %
PLATELET # BLD AUTO: 304 10E9/L (ref 150–450)
POTASSIUM SERPL-SCNC: 4.7 MMOL/L (ref 3.4–5.3)
PROT SERPL-MCNC: 7.4 G/DL (ref 6.8–8.8)
RBC # BLD AUTO: 4.43 10E12/L (ref 4.4–5.9)
SODIUM SERPL-SCNC: 140 MMOL/L (ref 133–144)
TSH SERPL DL<=0.005 MIU/L-ACNC: 2.3 MU/L (ref 0.4–4)
URATE SERPL-MCNC: 3.8 MG/DL (ref 3.5–7.2)
VIT B12 SERPL-MCNC: 213 PG/ML (ref 193–986)
WBC # BLD AUTO: 9.1 10E9/L (ref 4–11)

## 2021-04-09 PROCEDURE — 84443 ASSAY THYROID STIM HORMONE: CPT | Performed by: INTERNAL MEDICINE

## 2021-04-09 PROCEDURE — 82607 VITAMIN B-12: CPT | Performed by: INTERNAL MEDICINE

## 2021-04-09 PROCEDURE — 83036 HEMOGLOBIN GLYCOSYLATED A1C: CPT | Performed by: INTERNAL MEDICINE

## 2021-04-09 PROCEDURE — 84550 ASSAY OF BLOOD/URIC ACID: CPT | Performed by: INTERNAL MEDICINE

## 2021-04-09 PROCEDURE — 85025 COMPLETE CBC W/AUTO DIFF WBC: CPT | Performed by: INTERNAL MEDICINE

## 2021-04-09 PROCEDURE — 36415 COLL VENOUS BLD VENIPUNCTURE: CPT | Performed by: INTERNAL MEDICINE

## 2021-04-09 PROCEDURE — 99214 OFFICE O/P EST MOD 30 MIN: CPT | Performed by: INTERNAL MEDICINE

## 2021-04-09 PROCEDURE — 80053 COMPREHEN METABOLIC PANEL: CPT | Performed by: INTERNAL MEDICINE

## 2021-04-09 NOTE — PROGRESS NOTES
Assessment & Plan     Chronic idiopathic gout involving toe without tophus, unspecified laterality  today's appointment was an important one. I am told that this patient who had been receiving his primary care with the [facility] for many many years is not going to be attending there anymore and I will be seeing him for primary care. Daughter was present today and emphasized this point. Therefore we did a much more primary care focused review today and covered many different issues . Gout is well controlled and no flare-ups   - CBC with platelets and differential  - Comprehensive metabolic panel  - Uric acid  - Hemoglobin A1c  - TSH with free T4 reflex  - Vitamin B12    Elevated glucose  Doubt clinical significance but warrants hemoglobin a1c  [ diabetes test ] , we haven't seen his laboratory studies in years and years   - CBC with platelets and differential  - Comprehensive metabolic panel  - Uric acid  - Hemoglobin A1c  - TSH with free T4 reflex  - Vitamin B12    Hypertension goal BP (blood pressure) < 140/90  See office visit with this patient from January 2021. I am not positive on what's being taken. At the last office visit with me I had recommended he stop hydrochlorothiazide and yet it is still listed in the medication list. See patient after-visit summary. His blood pressure is over-treated and so I removed this medication from list and then depending on how things go we might next stop the atenolol (Tenormin). Although a heart rate of 52 is not concerning   - CBC with platelets and differential  - Comprehensive metabolic panel  - Uric acid  - Hemoglobin A1c  - TSH with free T4 reflex  - Vitamin B12    Stage 3 chronic kidney disease, unspecified whether stage 3a or 3b CKD  Due for recheck and follow up   - CBC with platelets and differential  - Comprehensive metabolic panel  - Uric acid  - Hemoglobin A1c  - TSH with free T4 reflex  - Vitamin B12    Sinus bradycardia  As above   -  CBC with platelets and differential  - Comprehensive metabolic panel  - Uric acid  - Hemoglobin A1c  - TSH with free T4 reflex  - Vitamin B12    Sensation of feeling cold  Patient has had Feelings of coldness and while we do suspect that this is the more common condition associated with aging, we certainly want to exclude thyroid disease or anemia or other possible disease processes   - CBC with platelets and differential  - Comprehensive metabolic panel  - Uric acid  - Hemoglobin A1c  - TSH with free T4 reflex  - Vitamin B12    Review of the result(s) of each unique test - prior lab values going back to 2016  25 minutes spent on the date of the encounter doing chart review, history and exam, documentation and further activities per the note         Return in about 3 months (around 7/9/2021).    Juwan Dickson MD  [facility] YONI Alas is a 89 year old who presents for the following health issues   Encounter Diagnoses   Name Primary?     Chronic idiopathic gout involving toe without tophus, unspecified laterality Yes     Elevated glucose      Hypertension goal BP (blood pressure) < 140/90      Stage 3 chronic kidney disease, unspecified whether stage 3a or 3b CKD      Sinus bradycardia      Sensation of feeling cold       accompanied by his daughter :    HPI   Chief Complaint   Patient presents with     Hypotension     Slow Heart Rate     Feelings of coldness , this has been getting worse , more noteworthy over last few months   Kids   Blood pressure history , for years had hypertension ,   BP Readings from Last 6 Encounters:   04/09/21 119/61   03/17/21 105/50   02/17/21 125/57   01/28/21 97/60   01/19/21 111/68   10/31/16 104/62     There was said to be a low blood pressure noted   Dermatologist noted a low blood pressure [ Dr. Herman]    A heart rate of 52 was noted    No history of falling and no history of loss of consciousness or dizziness symptoms or lightheadedness . Patient  has a wheeled walker with a seat but rarely uses this. Encouraged to use more faithfully     Detailed review of systems is just not worrisome and patient appears to be generally in good health !      Review of Systems   Constitutional, HEENT, cardiovascular, pulmonary, gi and gu systems are negative, except as otherwise noted.      Objective    /61   Pulse 61   Temp 98.3  F (36.8  C) (Oral)   Resp 16   Wt 69.4 kg (153 lb)   SpO2 99%   BMI 23.61 kg/m    Body mass index is 23.61 kg/m .  Physical Exam   GENERAL: healthy, alert and no distress  NECK: no adenopathy, no asymmetry, masses, or scars and thyroid normal to palpation  RESP: lungs clear to auscultation - no rales, rhonchi or wheezes  CV: regular rate and rhythm, normal S1 S2, no S3 or S4, no murmur, click or rub, no peripheral edema and peripheral pulses strong  MS: no gross musculoskeletal defects noted, no edema    Orders Placed This Encounter   Procedures     CBC with platelets and differential     Comprehensive metabolic panel     Uric acid     Hemoglobin A1c     TSH with free T4 reflex     Vitamin B12

## 2021-04-09 NOTE — PATIENT INSTRUCTIONS
We discontinued your hydrochlorothiazide today. Please remove this medication from your medicine chest.[email or web address].    A heart rate down to 50 or even high 40's is not a problem, below 44 is a real problem and might signify a need for a permanent pacemaker in upper left chest wall     Question - should we discontinue the atenolol (Tenormin) ?    Come back in

## 2021-04-13 DIAGNOSIS — N18.30 STAGE 3 CHRONIC KIDNEY DISEASE, UNSPECIFIED WHETHER STAGE 3A OR 3B CKD (H): Primary | ICD-10-CM

## 2021-05-05 ENCOUNTER — TELEPHONE (OUTPATIENT)
Dept: FAMILY MEDICINE | Facility: CLINIC | Age: 86
End: 2021-05-05

## 2021-05-05 NOTE — TELEPHONE ENCOUNTER
Please call [phone] Benita Hinds (daughter).    She has multiple questions regarding when patient should come back in for visit.

## 2021-05-11 ENCOUNTER — TELEPHONE (OUTPATIENT)
Dept: FAMILY MEDICINE | Facility: CLINIC | Age: 86
End: 2021-05-11

## 2021-05-11 DIAGNOSIS — N18.30 STAGE 3 CHRONIC KIDNEY DISEASE, UNSPECIFIED WHETHER STAGE 3A OR 3B CKD (H): ICD-10-CM

## 2021-05-11 LAB
ALBUMIN SERPL-MCNC: 3.8 G/DL (ref 3.4–5)
ALP SERPL-CCNC: 62 U/L (ref 40–150)
ALT SERPL W P-5'-P-CCNC: 16 U/L (ref 0–70)
ANION GAP SERPL CALCULATED.3IONS-SCNC: 2 MMOL/L (ref 3–14)
AST SERPL W P-5'-P-CCNC: 15 U/L (ref 0–45)
BILIRUB SERPL-MCNC: 0.6 MG/DL (ref 0.2–1.3)
BUN SERPL-MCNC: 27 MG/DL (ref 7–30)
CALCIUM SERPL-MCNC: 9.6 MG/DL (ref 8.5–10.1)
CHLORIDE SERPL-SCNC: 107 MMOL/L (ref 94–109)
CO2 SERPL-SCNC: 30 MMOL/L (ref 20–32)
CREAT SERPL-MCNC: 1.72 MG/DL (ref 0.66–1.25)
GFR SERPL CREATININE-BSD FRML MDRD: 33 ML/MIN/{1.73_M2}
GLUCOSE SERPL-MCNC: 92 MG/DL (ref 70–99)
POTASSIUM SERPL-SCNC: 3.8 MMOL/L (ref 3.4–5.3)
PROT SERPL-MCNC: 7.7 G/DL (ref 6.8–8.8)
SODIUM SERPL-SCNC: 139 MMOL/L (ref 133–144)

## 2021-05-11 PROCEDURE — 80053 COMPREHEN METABOLIC PANEL: CPT | Performed by: INTERNAL MEDICINE

## 2021-05-11 PROCEDURE — 36415 COLL VENOUS BLD VENIPUNCTURE: CPT | Performed by: INTERNAL MEDICINE

## 2021-05-11 NOTE — TELEPHONE ENCOUNTER
Reason for Call:  Form, our goal is to have forms completed with 72 hours, however, some forms may require a visit or additional information.    Type of letter, form or note:  disability    Who is the form from?: [employer] (if other please explain)    Where did the form come from: Patient or family brought in       What clinic location was the form placed at?: [facility]    Where the form was placed: Given to physician    What number is listed as a contact on the form?:  NA       Additional comments: Please mail in attached self addressed, postage paid envelope    Call taken on 5/11/2021 at 1:35 PM by Lynda Hansen

## 2021-05-13 PROBLEM — R54 FRAIL ELDERLY: Status: ACTIVE | Noted: 2021-05-13

## 2021-05-13 NOTE — TELEPHONE ENCOUNTER
Forms completed and signed.  Forms mailed to Amber Roche, [address] [place] [address]  [phone].  Kenia Ferrear,

## 2021-11-16 NOTE — TELEPHONE ENCOUNTER
Routing refill request to provider for review/approval because:  Prescriptions listed from 2015 as patient had not been seen for several years.    Patient started seeing Juwan Valdes again this year.  Last OV was 4/9/2021    Jose Jolly RN  [facility]

## 2021-11-22 NOTE — TELEPHONE ENCOUNTER
PCP Care Team/Triage-Please follow up with home care nurse, Machelle, with verbal orders.  Machelle's voicemail is confidential: [phone].    Priority nurse call received from GUME Carty with [facility].    Per Machelle:  1. Requesting verbal orders skilled nursing 1 visit per week for 8 weeks with 6 PRN visits  2. Physical therapy and Occupational therapy eval and treat  3. Patient discharged from TCU a few weeks ago   A. Was admitted due to bladder obstruction  4. Has new moran catheter, awaiting to follow up with Urology  5. Patient moved into assisted living facility-[facility]   A. Patient is working to switch to the rounding provider at that facility but has not made the switch as of yet  6. Would PCP be comfortable providing orders for catheter management while awaiting to establish care with Urology?   A. Family is still working on scheduling appt with Urology    Machelle informed message will be sent to patient's PCP care team.    Machelle verbalized understanding and in agreement with plan.      Thank you!  DAVIS WatkinsN, RN  [facility]

## 2021-11-22 NOTE — TELEPHONE ENCOUNTER
I can provide such orders but this is an emergency stop-gap measure and these orders can't suffice for at the very most 3-4 weeks.    Urologist is recommended     Juwan Dickson MD

## 2021-12-14 NOTE — TELEPHONE ENCOUNTER
Grzegorz with [facility] calling to get verbal orders for OT visits to work on toileting 1 x 1 week, 2 x 2 week, 1 x 1 week    Verbal orders given as requested.  Grzegorz verbalized understanding.    Jose Jolly RN  [facility]

## 2022-01-01 ENCOUNTER — PATIENT OUTREACH (OUTPATIENT)
Dept: FAMILY MEDICINE | Facility: CLINIC | Age: 87
End: 2022-01-01
Payer: COMMERCIAL

## 2022-01-01 ENCOUNTER — HOSPITAL ENCOUNTER (EMERGENCY)
Facility: HOSPITAL | Age: 87
Discharge: HOME | End: 2022-02-28
Attending: EMERGENCY MEDICINE | Admitting: EMERGENCY MEDICINE
Payer: COMMERCIAL

## 2022-01-01 ENCOUNTER — HEALTH MAINTENANCE LETTER (OUTPATIENT)
Age: 87
End: 2022-01-01

## 2022-01-01 VITALS
HEART RATE: 62 BPM | TEMPERATURE: 98 F | SYSTOLIC BLOOD PRESSURE: 137 MMHG | OXYGEN SATURATION: 99 % | RESPIRATION RATE: 18 BRPM | DIASTOLIC BLOOD PRESSURE: 62 MMHG

## 2022-01-01 DIAGNOSIS — E55.9 VITAMIN D DEFICIENCY: ICD-10-CM

## 2022-01-01 DIAGNOSIS — M1A.0790 CHRONIC IDIOPATHIC GOUT INVOLVING TOE WITHOUT TOPHUS, UNSPECIFIED LATERALITY: ICD-10-CM

## 2022-01-01 DIAGNOSIS — T83.9XXA FOLEY CATHETER PROBLEM, INITIAL ENCOUNTER (H): ICD-10-CM

## 2022-01-01 DIAGNOSIS — I10 HYPERTENSION GOAL BP (BLOOD PRESSURE) < 140/90: ICD-10-CM

## 2022-01-01 DIAGNOSIS — N18.30 STAGE 3 CHRONIC KIDNEY DISEASE, UNSPECIFIED WHETHER STAGE 3A OR 3B CKD (H): Primary | ICD-10-CM

## 2022-01-01 DIAGNOSIS — G47.00 INSOMNIA, UNSPECIFIED TYPE: ICD-10-CM

## 2022-01-01 DIAGNOSIS — N40.0 BPH (BENIGN PROSTATIC HYPERPLASIA): ICD-10-CM

## 2022-01-01 LAB
ALBUMIN UR-MCNC: 70 MG/DL
APPEARANCE UR: ABNORMAL
BACTERIA UR CULT: ABNORMAL
BACTERIA UR CULT: ABNORMAL
BILIRUB UR QL STRIP: NEGATIVE
COLOR UR AUTO: YELLOW
GLUCOSE UR STRIP-MCNC: NEGATIVE MG/DL
HGB UR QL STRIP: ABNORMAL
KETONES UR STRIP-MCNC: NEGATIVE MG/DL
LEUKOCYTE ESTERASE UR QL STRIP: ABNORMAL
MUCOUS THREADS #/AREA URNS LPF: PRESENT /LPF
NITRATE UR QL: POSITIVE
PH UR STRIP: 6 [PH] (ref 5–7)
RBC URINE: >182 /HPF
SP GR UR STRIP: 1.02 (ref 1–1.03)
SQUAMOUS EPITHELIAL: 1 /HPF
UROBILINOGEN UR STRIP-MCNC: <2 MG/DL
WBC CLUMPS #/AREA URNS HPF: PRESENT /HPF
WBC URINE: >182 /HPF

## 2022-01-01 PROCEDURE — 250N000009 HC RX 250: Performed by: EMERGENCY MEDICINE

## 2022-01-01 PROCEDURE — 51702 INSERT TEMP BLADDER CATH: CPT

## 2022-01-01 PROCEDURE — 99283 EMERGENCY DEPT VISIT LOW MDM: CPT

## 2022-01-01 PROCEDURE — 87086 URINE CULTURE/COLONY COUNT: CPT | Performed by: EMERGENCY MEDICINE

## 2022-01-01 PROCEDURE — 81001 URINALYSIS AUTO W/SCOPE: CPT | Performed by: EMERGENCY MEDICINE

## 2022-01-01 RX ORDER — FINASTERIDE 5 MG/1
TABLET, FILM COATED ORAL
Qty: 90 TABLET | Refills: 0 | Status: SHIPPED | OUTPATIENT
Start: 2022-01-01

## 2022-01-01 RX ORDER — LIDOCAINE HYDROCHLORIDE 20 MG/ML
10 JELLY TOPICAL ONCE
Status: COMPLETED | OUTPATIENT
Start: 2022-01-01 | End: 2022-01-01

## 2022-01-01 RX ORDER — CHOLECALCIFEROL (VITAMIN D3) 25 MCG
TABLET ORAL
Qty: 90 TABLET | Refills: 0 | Status: SHIPPED | OUTPATIENT
Start: 2022-01-01

## 2022-01-01 RX ORDER — ALLOPURINOL 100 MG/1
TABLET ORAL
Qty: 30 TABLET | Refills: 0 | Status: SHIPPED | OUTPATIENT
Start: 2022-01-01 | End: 2022-01-01

## 2022-01-01 RX ORDER — AMLODIPINE BESYLATE 5 MG/1
TABLET ORAL
Qty: 30 TABLET | Refills: 0 | Status: SHIPPED | OUTPATIENT
Start: 2022-01-01

## 2022-01-01 RX ORDER — LOSARTAN POTASSIUM 50 MG/1
TABLET ORAL
Qty: 28 TABLET | Refills: 0 | Status: SHIPPED | OUTPATIENT
Start: 2022-01-01

## 2022-01-01 RX ORDER — QUETIAPINE FUMARATE 25 MG/1
TABLET, FILM COATED ORAL
Qty: 30 TABLET | Refills: 0 | Status: SHIPPED | OUTPATIENT
Start: 2022-01-01

## 2022-01-01 RX ORDER — ALLOPURINOL 100 MG/1
TABLET ORAL
Qty: 14 TABLET | Refills: 0 | Status: SHIPPED | OUTPATIENT
Start: 2022-01-01

## 2022-01-01 RX ORDER — TAMSULOSIN HYDROCHLORIDE 0.4 MG/1
CAPSULE ORAL
Qty: 90 CAPSULE | Refills: 0 | Status: SHIPPED | OUTPATIENT
Start: 2022-01-01

## 2022-01-01 RX ORDER — MULTIVIT,CALC,MINS/IRON/FOLIC 9MG-400MCG
TABLET ORAL
Qty: 30 TABLET | Refills: 0 | Status: SHIPPED | OUTPATIENT
Start: 2022-01-01

## 2022-01-01 RX ADMIN — LIDOCAINE HYDROCHLORIDE 10 ML: 20 JELLY TOPICAL at 11:45

## 2022-01-01 ASSESSMENT — ENCOUNTER SYMPTOMS: ABDOMINAL PAIN: 0

## 2022-01-07 NOTE — TELEPHONE ENCOUNTER
"Routing refill request to provider for review/approval because:  Drug not on the [facility] refill protocol   Labs out of range:  CR  Labs not current:  Lipid Panel  Patient needs to be seen because it has been more than 6 months since last office visit.      Requested Prescriptions   Pending Prescriptions Disp Refills     allopurinol (ZYLOPRIM) 100 MG tablet [Pharmacy Med Name: Allopurinol 100 MG Tablet] 28 tablet 12     Sig: TAKE 1 TABLET BY MOUTH EVERY MORNING       Gout Agents Protocol Failed - 1/5/2022  7:22 PM        Failed - Normal serum creatinine on file in the past 12 months     Recent Labs   Lab Test 11/09/21  0545   CR 2.14*       Ok to refill medication if creatinine is low          Passed - CBC on file in past 12 months     Recent Labs   Lab Test 11/08/21  0850   WBC 8.3   RBC 3.90*   HGB 12.3*   HCT 38.0*                    Passed - ALT on file in past 12 months     Recent Labs   Lab Test 05/11/21  1031   ALT 16             Passed - Has Uric Acid on file in past 12 months and value is less than 6     Recent Labs   Lab Test 04/09/21  1306   URIC 3.8     If level is 6mg/dL or greater, ok to refill one time and refer to provider.           Passed - Recent (12 mo) or future (30 days) visit within the authorizing provider's specialty     Patient has had an office visit with the authorizing provider or a provider within the authorizing providers department within the previous 12 mos or has a future within next 30 days. See \"Patient Info\" tab in inbasket, or \"Choose Columns\" in Meds & Orders section of the refill encounter.              Passed - Medication is active on med list        Passed - Patient is age 18 or older           amLODIPine (NORVASC) 5 MG tablet [Pharmacy Med Name: amLODIPine Besylate 5 MG Tablet] 28 tablet 12     Sig: TAKE 1 TABLET BY MOUTH EVERY MORNING       Calcium Channel Blockers Protocol  Failed - 1/5/2022  7:22 PM        Failed - Normal serum creatinine on file in past 12 months     " "Recent Labs   Lab Test 11/09/21  0545   CR 2.14*       Ok to refill medication if creatinine is low          Passed - Blood pressure under 140/90 in past 12 months     BP Readings from Last 3 Encounters:   04/09/21 119/61   03/17/21 105/50   02/17/21 125/57                 Passed - Recent (12 mo) or future (30 days) visit within the authorizing provider's specialty     Patient has had an office visit with the authorizing provider or a provider within the authorizing providers department within the previous 12 mos or has a future within next 30 days. See \"Patient Info\" tab in inbasket, or \"Choose Columns\" in Meds & Orders section of the refill encounter.              Passed - Medication is active on med list        Passed - Patient is age 18 or older           finasteride (PROSCAR) 5 MG tablet [Pharmacy Med Name: Finasteride 5 MG Tablet] 28 tablet 12     Sig: TAKE 1 TABLET BY MOUTH EVERY MORNING *HAZARDOUS DRUG*       BPH Agents Passed - 1/5/2022  7:22 PM        Passed - Recent (12 mo) or future (30 days) visit within the authorizing provider's department     Patient has had an office visit with the authorizing provider or a provider within the authorizing providers department within the previous 12 mos or has a future within next 30 days. See \"Patient Info\" tab in inbasket, or \"Choose Columns\" in Meds & Orders section of the refill encounter.              Passed - Medication is active on med list        Passed - Patient is 18 years of age or older           GNP MELATONIN 3 MG tablet [Pharmacy Med Name: GNP Melatonin 3 MG Tablet] 28 tablet 12     Sig: TAKE 1 TABLET BY MOUTH EVERY NIGHT AT BEDTIME       There is no refill protocol information for this order        QUEtiapine (SEROQUEL) 25 MG tablet [Pharmacy Med Name: QUEtiapine Fumarate 25 MG Tablet] 14 tablet 12     Sig: TAKE 1/2 TABLET BY MOUTH DAILY IN THE AFTERNOON (12.5MG)       Antipsychotic Medications Failed - 1/5/2022  7:22 PM        Failed - Lipid panel on " "file within the past 12 months     Recent Labs   Lab Test 05/07/15  1103 11/14/13  0000   CHOL  --  185   TRIG  --  194*   HDL  --  59   LDL 95 87   VLDL  --  39               Failed - Recent (6 mo) or future (30 days) visit within the authorizing provider's specialty     Patient had office visit in the last 6 months or has a visit in the next 30 days with authorizing provider or within the authorizing provider's specialty.  See \"Patient Info\" tab in inbasket, or \"Choose Columns\" in Meds & Orders section of the refill encounter.            Passed - Blood pressure under 140/90 in past 12 months     BP Readings from Last 3 Encounters:   04/09/21 119/61   03/17/21 105/50   02/17/21 125/57                 Passed - Patient is 12 years of age or older        Passed - CBC on file in past 12 months     Recent Labs   Lab Test 11/08/21  0850   WBC 8.3   RBC 3.90*   HGB 12.3*   HCT 38.0*                    Passed - Heart Rate on file within past 12 months     Pulse Readings from Last 3 Encounters:   04/09/21 61   03/17/21 50   02/17/21 52               Passed - A1c or Glucose on file in past 12 months     Recent Labs   Lab Test 11/09/21  0545 05/11/21  1031 04/09/21  1306   GLC 78   < > 96   A1C  --   --  5.3    < > = values in this interval not displayed.       Please review patients last 3 weights. If a weight gain of >10 lbs exists, you may refill the prescription once after instructing the patient to schedule an appointment within the next 30 days.    Wt Readings from Last 3 Encounters:   04/09/21 69.4 kg (153 lb)   01/28/21 70.3 kg (155 lb)   10/31/16 80.3 kg (177 lb)             Passed - Medication is active on med list           tamsulosin (FLOMAX) 0.4 MG capsule [Pharmacy Med Name: Tamsulosin HCl 0.4 MG Capsule] 28 capsule 12     Sig: TAKE 1 CAPSULE BY MOUTH DAILY AFTER A MEAL       Alpha Blockers Passed - 1/5/2022  7:22 PM        Passed - Blood pressure under 140/90 in past 12 months     BP Readings from Last 3 " "Encounters:   04/09/21 119/61   03/17/21 105/50   02/17/21 125/57                 Passed - Recent (12 mo) or future (30 days) visit within the authorizing provider's specialty     Patient has had an office visit with the authorizing provider or a provider within the authorizing providers department within the previous 12 mos or has a future within next 30 days. See \"Patient Info\" tab in inbasket, or \"Choose Columns\" in Meds & Orders section of the refill encounter.              Passed - Patient does not have Tadalafil, Vardenafil, or Sildenafil on their medication list        Passed - Medication is active on med list        Passed - Patient is 18 years of age or older           VITAMIN D3 25 MCG (1000 UT) tablet [Pharmacy Med Name: Vitamin D3 25 MCG (1000 UT) Tablet] 28 tablet 12     Sig: TAKE 1 TABLET BY MOUTH EVERY MORNING       Vitamin Supplements (Adult) Protocol Passed - 1/5/2022  7:22 PM        Passed - High dose Vitamin D not ordered        Passed - Recent (12 mo) or future (30 days) visit within the authorizing provider's specialty     Patient has had an office visit with the authorizing provider or a provider within the authorizing providers department within the previous 12 mos or has a future within next 30 days. See \"Patient Info\" tab in inbasket, or \"Choose Columns\" in Meds & Orders section of the refill encounter.              Passed - Medication is active on med list           Petty Akbar RN on 1/7/2022 at 10:31 AM    "

## 2022-02-03 NOTE — TELEPHONE ENCOUNTER
"Routing refill request to provider for review/approval because:  Labs out of range:  creatinine  Creatinine   Date Value Ref Range Status   11/09/2021 2.14 (H) 0.70 - 1.30 mg/dL Final   05/11/2021 1.72 (H) 0.66 - 1.25 mg/dL Final              Pending Prescriptions:                       Disp   Refills    allopurinol (ZYLOPRIM) 100 MG tablet [Phar*14 tab*12       Sig: TAKE 1 TABLET BY MOUTH EVERY \"OTHER\" DAY    losartan (COZAAR) 50 MG tablet [Pharmacy M*28 tab*12       Sig: TAKE 1 TABLET BY MOUTH DAILY        Andry Alston RN          "

## 2022-02-28 NOTE — ED NOTES
Bed: JNED-11  Expected date: 2/28/22  Expected time: 10:38 AM  Means of arrival:   Comments:  Garland pulled out/jesus

## 2022-02-28 NOTE — ED PROVIDER NOTES
EMERGENCY DEPARTMENT ENCOUNTER       ED Course & Medical Decision Making     11:04 AM I met with the patient to gather history and to perform my initial exam. I discussed the plan for care while in the Emergency Department. PPE (gloves and surgical mask) was worn by me during patient encounters while patient wore mask.         I saw and examined the patient.  His only complaint is that he accidentally pulled out his Haque catheter.    Given that there was bleeding we did attempt a three-way flushing catheter, however this would not pass.  We then attempted a coudé catheter which was successfully passed by nursing.    This was tolerated well.  Initially there was some hematuria but this clears quite rapidly and the catheter is functioning properly    There are no other complaints and plan is to discharge him and have him follow-up with his urologist.  Urine will be sent for culture          Prior to making a final disposition on this patient the results of patient's tests and other diagnostic studies were discussed with the patient. All questions were answered. Patient expressed understanding of the plan and was amenable to it.    Medications   lidocaine (XYLOCAINE) 2 % external gel 10 mL (has no administration in time range)       Final Impression     1. Haque catheter problem, initial encounter (H)            Chief Complaint     Chief Complaint   Patient presents with     Catheter Problem       No notes on file    HPI       Bishop Hinds is a 89 year old male with a history of BPH, bladder outlet obstruction, HTN, HLD, CKD3, who presents for evaluation of catheter problem.    Per nursing report, patient arrives via EMS from home where he lives independently with wife. Patient's Haque catheter was wrapped around his walker and when he sat down it was pulled out with balloon inflated. Patient has since had bleeding with clots from his urethra. Patient denies pain currently but was noted to have penile pain with  palpation on nurse's examination. Denies any chest pain, abdominal pain, or additional medical concerns or complaints at this time.      I, Nancy Roth am serving as a scribe to document services personally performed by Alonzo Alonso M.D. based on my observation and the provider's statements to me. I, Alonzo Alonso M.D attest that Nancy Valdezedorn is acting in a scribe capacity, has observed my performance of the services and has documented them in accordance with my direction.    Past Medical History     Past Medical History:   Diagnosis Date     Bladder stone 2010     Cataract      CKD (chronic kidney disease) stage 3, GFR 30-59 ml/min (H)      Colon polyps      DJD (degenerative joint disease), cervical      DAY (dyspnea on exertion)      Elevated glucose      Elevated PSA      Eustachian tube dysfunction      Gout      History of basal cell carcinoma 1/28/2021     HTN (hypertension) 11/25/2009     Hyperlipidemia LDL goal <130 11/25/2009     Keratosis seborrheica      OA (osteoarthritis) of knee      SNHL (sensorineural hearing loss)      Unspecified hypertensive heart disease without heart failure      Past Surgical History:   Procedure Laterality Date     ARTHROPLASTY MINIMALLY INVASIVE KNEE  2/5/2014    Procedure: ARTHROPLASTY MINIMALLY INVASIVE KNEE;  Right Total Knee Arthroplasty Minimally Invasive ;  Surgeon: Alvarez Giang MD;  Location: [facility] OR     CATARACT IOL, RT/LT       COLONOSCOPY  12/94    14 polypls     CYSTOSCOPY  2/2010     CYSTOSCOPY  2/2010    had  2 procedures for removal of bladder stones     EXCISE LESION CHEEK N/A 10/8/2014    Procedure: EXCISE LESION CHEEK;  Surgeon: Sarah Macdonald MD;  Location: [facility] OR     LASER YAG CAPSULOTOMY  3/2015    right eye     PHACOEMULSIFICATION WITH STANDARD INTRAOCULAR LENS IMPLANT  10/2014; 5/2015    right eye; left eye     TONSILLECTOMY & ADENOIDECTOMY  age of 6     Family History   Problem Relation Age of Onset     Cerebrovascular Disease Mother       Cancer Sister      Diabetes Son       Social History     Tobacco Use     Smoking status: Former Smoker     Years: 60.00     Types: Cigarettes     Smokeless tobacco: Never Used   Substance Use Topics     Alcohol use: Yes     Comment: Occasional     Drug use: No       Relevant past medical, surgical, family and social history as documented above, has been reviewed and discussed with patient. No changes or additions, unless otherwise noted in the HPI.    Current Medications     acetaminophen (TYLENOL) 325 MG tablet  allopurinol (ZYLOPRIM) 100 MG tablet  allopurinol (ZYLOPRIM) 300 MG tablet  amLODIPine (NORVASC) 5 MG tablet  aspirin 81 MG tablet  atenolol (TENORMIN) 25 MG tablet  Cholecalciferol (VITAMIN D) 1000 UNITS capsule  Docusate Sodium (STOOL SOFTENER LAXATIVE PO)  finasteride (PROSCAR) 5 MG tablet  GNP MELATONIN 3 MG tablet  losartan (COZAAR) 50 MG tablet  LOSARTAN POTASSIUM PO  QUEtiapine (SEROQUEL) 25 MG tablet  simvastatin (ZOCOR) 80 MG tablet  tamsulosin (FLOMAX) 0.4 MG capsule  VITAMIN D3 25 MCG (1000 UT) tablet        Allergies     Allergies   Allergen Reactions     Nkda [No Known Drug Allergies]        Review of Systems     Review of Systems   Cardiovascular: Negative for chest pain.   Gastrointestinal: Negative for abdominal pain.   Genitourinary: Negative for penile pain.        Positive for bleeding and clots from urethra.      Remainder of systems reviewed, unless noted in HPI all others negative.    Physical Exam     BP (!) 140/65   Pulse 67   Temp 98  F (36.7  C) (Oral)   Resp 18   SpO2 98%     Physical Exam  Vitals and nursing note reviewed.   Constitutional:       General: He is not in acute distress.  HENT:      Head: Normocephalic.      Nose: Nose normal.   Eyes:      General: No scleral icterus.  Cardiovascular:      Rate and Rhythm: Normal rate.   Pulmonary:      Effort: Pulmonary effort is normal.   Genitourinary:     Comments: Blood at the urethral meatus  Musculoskeletal:       Cervical back: Neck supple.   Skin:     Findings: No rash.   Neurological:      Mental Status: He is alert. Mental status is at baseline.   Psychiatric:         Mood and Affect: Mood normal.             Labs & Imaging         Labs Ordered and Resulted from Time of ED Arrival to Time of ED Departure - No data to display           Alonzo Alonso MD  Emergency Medicine  [facility] EMERGENCY DEPARTMENT  [address] [place] [id number]  [phone]  2/28/2022       Alonzo Alonso MD  02/28/22 1242

## 2022-02-28 NOTE — ED TRIAGE NOTES
Brought in by ambulance after Haque was accidentally pulled out with balloon inflated. EMS reports tubing was wrapped around walker. Large amount of blood in brief with clot seen in urethra. Pt reports pain with examination.

## 2022-03-01 NOTE — TELEPHONE ENCOUNTER
Follow-Ups     1 Follow up with Juwan Dickson MD (Internal Medicine)  2 Follow up with [facility] Emergency Department (EMERGENCY MEDICINE); If symptoms worsen

## 2022-03-02 NOTE — TELEPHONE ENCOUNTER
"ED / Discharge Outreach Protocol    Patient Contact    Attempt # 1    Was call answered?  Yes.  \"May I please speak with Bishop\"  Is patient available?   Yes, however, patient hung up the phone  ______________________________    Patient was in the ED for moran catheter issues.  He was advised to f/u with urology.    Called patient.  He hung up the phone during the call and did not answer all questions asked   He may have been having trouble hearing.    Called daughter, Amber.  She stated that patient is doing fine and has an upcoming appointment with urology but she does not know the exact date.  Explained that patient is also overdue for a f/u with Juwan Valdes   Last visit was 4/9/2021 and was to f/u in 3 months around 7/2021  Amber was sure that patient was seen by Juwan Valdes around December 2021, \"he was seen around the holidays\"  Explained that we do not have any records of patient being seen by primary care after 4/9/2021.  She continued to disagree, then stated that she did not understand why patient would need to be seen by Juwan Valdes if he is not sick.  Explained that PCP is managing multiple health issues and prescriptions which requires monitoring to ensure meds are safe to continue or if any changes need to be made.   She then stated that [facility] and [facility].  However, she verified that patient will also continue to see Juwan Valdes but disagrees that he needs to be seen by Juwan Valdes at this time unless patient gets sick.  This was not the same conversation that was documented during last OV on 4/9/2022 when daughter emphasized to Juwan Valdes that [facility] would no longer [facility] and that Juwan Valdes would be taking over primary care.    Routing to provider as KANDI Jolly RN  [facility]    "

## 2022-11-22 NOTE — TELEPHONE ENCOUNTER
Spoke with Daughter Benita, consent to communicate on file. She is currently the designated contact for patient.     Benita was asking for clarification why patient has lab appointment scheduled for 5/11. Writer saw that due to lab results from 4/9, Dr. Dickson recommended CBC done within 2-4 weeks to check kidney function. Benita verbalized understanding for appointment and that once Dr. Dickson reviews next results from 5/11, he will make recommendations for patient. At that time clinic staff will contact with those recommendations/result notes. No further questions or concerns.    Benita asks that we call her with those results. Call cell number first. If it goes to VM do not leave a message because her cell  does not work. Asking that we call home number next to leave a message.    Marina ABDI RN, BSN  [facility]     Griseofulvin Pregnancy And Lactation Text: This medication is Pregnancy Category X and is known to cause serious birth defects. It is unknown if this medication is excreted in breast milk but breast feeding should be avoided.